# Patient Record
Sex: FEMALE | Race: OTHER | Employment: OTHER | URBAN - METROPOLITAN AREA
[De-identification: names, ages, dates, MRNs, and addresses within clinical notes are randomized per-mention and may not be internally consistent; named-entity substitution may affect disease eponyms.]

---

## 2019-07-02 ENCOUNTER — APPOINTMENT (OUTPATIENT)
Dept: GENERAL RADIOLOGY | Facility: HOSPITAL | Age: 84
End: 2019-07-02
Attending: EMERGENCY MEDICINE

## 2019-07-02 ENCOUNTER — HOSPITAL ENCOUNTER (EMERGENCY)
Facility: HOSPITAL | Age: 84
Discharge: HOME | End: 2019-07-02
Attending: EMERGENCY MEDICINE

## 2019-07-02 VITALS
DIASTOLIC BLOOD PRESSURE: 77 MMHG | SYSTOLIC BLOOD PRESSURE: 151 MMHG | BODY MASS INDEX: 49.04 KG/M2 | TEMPERATURE: 98 F | HEART RATE: 81 BPM | RESPIRATION RATE: 20 BRPM | OXYGEN SATURATION: 98 % | HEIGHT: 56 IN | WEIGHT: 218 LBS

## 2019-07-02 DIAGNOSIS — R73.9 HYPERGLYCEMIA: Primary | ICD-10-CM

## 2019-07-02 DIAGNOSIS — S20.229A CONTUSION OF BACK, UNSPECIFIED LATERALITY, INITIAL ENCOUNTER: ICD-10-CM

## 2019-07-02 LAB
ALBUMIN SERPL-MCNC: 3.3 G/DL (ref 3.4–5)
ALBUMIN/GLOB SERPL: 0.8 {RATIO} (ref 1–2)
ALP LIVER SERPL-CCNC: 106 U/L (ref 55–142)
ALT SERPL-CCNC: 25 U/L (ref 13–56)
ANION GAP SERPL CALC-SCNC: 6 MMOL/L (ref 0–18)
AST SERPL-CCNC: 24 U/L (ref 15–37)
BASOPHILS # BLD AUTO: 0.02 X10(3) UL (ref 0–0.2)
BASOPHILS NFR BLD AUTO: 0.2 %
BILIRUB SERPL-MCNC: 0.7 MG/DL (ref 0.1–2)
BILIRUB UR QL STRIP.AUTO: NEGATIVE
BUN BLD-MCNC: 19 MG/DL (ref 7–18)
BUN/CREAT SERPL: 20.2 (ref 10–20)
CALCIUM BLD-MCNC: 8.9 MG/DL (ref 8.5–10.1)
CHLORIDE SERPL-SCNC: 104 MMOL/L (ref 98–112)
CLARITY UR REFRACT.AUTO: CLEAR
CO2 SERPL-SCNC: 26 MMOL/L (ref 21–32)
COLOR UR AUTO: YELLOW
CREAT BLD-MCNC: 0.94 MG/DL (ref 0.55–1.02)
DEPRECATED RDW RBC AUTO: 48.6 FL (ref 35.1–46.3)
EOSINOPHIL # BLD AUTO: 0.14 X10(3) UL (ref 0–0.7)
EOSINOPHIL NFR BLD AUTO: 1.3 %
ERYTHROCYTE [DISTWIDTH] IN BLOOD BY AUTOMATED COUNT: 14.6 % (ref 11–15)
GLOBULIN PLAS-MCNC: 4 G/DL (ref 2.8–4.4)
GLUCOSE BLD-MCNC: 199 MG/DL (ref 70–99)
GLUCOSE UR STRIP.AUTO-MCNC: NEGATIVE MG/DL
HCT VFR BLD AUTO: 41.6 % (ref 35–48)
HGB BLD-MCNC: 13.6 G/DL (ref 12–16)
IMM GRANULOCYTES # BLD AUTO: 0.08 X10(3) UL (ref 0–1)
IMM GRANULOCYTES NFR BLD: 0.8 %
INR BLD: 2 (ref 0.9–1.1)
KETONES UR STRIP.AUTO-MCNC: NEGATIVE MG/DL
LYMPHOCYTES # BLD AUTO: 2.36 X10(3) UL (ref 1–4)
LYMPHOCYTES NFR BLD AUTO: 22.5 %
M PROTEIN MFR SERPL ELPH: 7.3 G/DL (ref 6.4–8.2)
MCH RBC QN AUTO: 29.7 PG (ref 26–34)
MCHC RBC AUTO-ENTMCNC: 32.7 G/DL (ref 31–37)
MCV RBC AUTO: 90.8 FL (ref 80–100)
MONOCYTES # BLD AUTO: 0.57 X10(3) UL (ref 0.1–1)
MONOCYTES NFR BLD AUTO: 5.4 %
NEUTROPHILS # BLD AUTO: 7.34 X10 (3) UL (ref 1.5–7.7)
NEUTROPHILS # BLD AUTO: 7.34 X10(3) UL (ref 1.5–7.7)
NEUTROPHILS NFR BLD AUTO: 69.8 %
NITRITE UR QL STRIP.AUTO: NEGATIVE
OSMOLALITY SERPL CALC.SUM OF ELEC: 290 MOSM/KG (ref 275–295)
PH UR STRIP.AUTO: 5 [PH] (ref 4.5–8)
PLATELET # BLD AUTO: 168 10(3)UL (ref 150–450)
POTASSIUM SERPL-SCNC: 4.3 MMOL/L (ref 3.5–5.1)
PROT UR STRIP.AUTO-MCNC: NEGATIVE MG/DL
PSA SERPL DL<=0.01 NG/ML-MCNC: 23.9 SECONDS (ref 12.5–14.7)
RBC # BLD AUTO: 4.58 X10(6)UL (ref 3.8–5.3)
RBC UR QL AUTO: NEGATIVE
SODIUM SERPL-SCNC: 136 MMOL/L (ref 136–145)
SP GR UR STRIP.AUTO: 1.01 (ref 1–1.03)
UROBILINOGEN UR STRIP.AUTO-MCNC: <2 MG/DL
WBC # BLD AUTO: 10.5 X10(3) UL (ref 4–11)

## 2019-07-02 PROCEDURE — 85610 PROTHROMBIN TIME: CPT | Performed by: EMERGENCY MEDICINE

## 2019-07-02 PROCEDURE — 80053 COMPREHEN METABOLIC PANEL: CPT | Performed by: EMERGENCY MEDICINE

## 2019-07-02 PROCEDURE — 36415 COLL VENOUS BLD VENIPUNCTURE: CPT

## 2019-07-02 PROCEDURE — 72100 X-RAY EXAM L-S SPINE 2/3 VWS: CPT | Performed by: EMERGENCY MEDICINE

## 2019-07-02 PROCEDURE — 99284 EMERGENCY DEPT VISIT MOD MDM: CPT

## 2019-07-02 PROCEDURE — 72170 X-RAY EXAM OF PELVIS: CPT | Performed by: EMERGENCY MEDICINE

## 2019-07-02 PROCEDURE — 72220 X-RAY EXAM SACRUM TAILBONE: CPT | Performed by: EMERGENCY MEDICINE

## 2019-07-02 PROCEDURE — 81001 URINALYSIS AUTO W/SCOPE: CPT | Performed by: EMERGENCY MEDICINE

## 2019-07-02 PROCEDURE — 85025 COMPLETE CBC W/AUTO DIFF WBC: CPT | Performed by: EMERGENCY MEDICINE

## 2019-07-02 RX ORDER — SIMVASTATIN 10 MG
10 TABLET ORAL NIGHTLY
COMMUNITY

## 2019-07-02 RX ORDER — LOSARTAN POTASSIUM 50 MG/1
TABLET ORAL DAILY
COMMUNITY

## 2019-07-02 RX ORDER — METOPROLOL SUCCINATE 50 MG/1
50 TABLET, EXTENDED RELEASE ORAL DAILY
COMMUNITY

## 2019-07-02 RX ORDER — ACETAMINOPHEN 500 MG
TABLET ORAL
Status: DISCONTINUED
Start: 2019-07-02 | End: 2019-07-02

## 2019-07-02 RX ORDER — SPIRONOLACTONE 25 MG/1
25 TABLET ORAL DAILY
COMMUNITY

## 2019-07-02 RX ORDER — ACETAMINOPHEN 500 MG
1000 TABLET ORAL ONCE
Status: COMPLETED | OUTPATIENT
Start: 2019-07-02 | End: 2019-07-02

## 2019-07-02 RX ORDER — MELOXICAM 7.5 MG/1
7.5 TABLET ORAL DAILY
COMMUNITY

## 2019-07-02 RX ORDER — MORPHINE SULFATE 4 MG/ML
4 INJECTION, SOLUTION INTRAMUSCULAR; INTRAVENOUS EVERY 30 MIN PRN
Status: DISCONTINUED | OUTPATIENT
Start: 2019-07-02 | End: 2019-07-02

## 2019-07-02 RX ORDER — RABEPRAZOLE SODIUM 20 MG/1
20 TABLET, DELAYED RELEASE ORAL DAILY
COMMUNITY

## 2019-07-02 RX ORDER — WARFARIN SODIUM 3 MG/1
3 TABLET ORAL DAILY
Status: ON HOLD | COMMUNITY
End: 2021-12-29

## 2019-07-02 RX ORDER — ONDANSETRON 2 MG/ML
4 INJECTION INTRAMUSCULAR; INTRAVENOUS ONCE
Status: DISCONTINUED | OUTPATIENT
Start: 2019-07-02 | End: 2019-07-02

## 2019-07-02 NOTE — ED INITIAL ASSESSMENT (HPI)
Mid lower back pain x1 day. Fall yesterday. Hit head on walker but no pain with fall. No LOC. Vitals stable.   Hx CHF

## 2019-07-02 NOTE — ED PROVIDER NOTES
Patient Seen in: [facility] Emergency Department    History   Patient presents with:  Back Pain (musculoskeletal)    Stated Complaint: back pain    HPI    Patient presents with back pain. The patient fell and injured her back yesterday.   Family sta Physical Exam    General: Awake and alert, in no acute distress when not moving. HEENT: Normocephalic, atraumatic, pupils equal round and reactive to light, oropharynx clear, uvula midline. Neck: No midline C-spine tenderness.   Cardiovascular: Re Abnormal            Final result                 Please view results for these tests on the individual orders.    RAINBOW DRAW BLUE   RAINBOW DRAW LAVENDER   RAINBOW DRAW LIGHT GREEN   RAINBOW DRAW GOLD          Xr Pelvis (1 View) (cpt=72134)    Result Date lumbar lordosis. No significant spondylolisthesis is present. Vertebral bodies appear maintained in height. Mild intervertebral disc space narrowing is present at L2-3. Likely moderate multilevel facet arthropathy present.       CONCLUSION:  Severe diff

## 2021-06-04 ENCOUNTER — OFFICE VISIT (OUTPATIENT)
Dept: NEUROLOGY | Facility: CLINIC | Age: 86
End: 2021-06-04
Payer: MEDICAID

## 2021-06-04 ENCOUNTER — LAB ENCOUNTER (OUTPATIENT)
Dept: LAB | Age: 86
End: 2021-06-04
Attending: Other
Payer: MEDICAID

## 2021-06-04 VITALS
SYSTOLIC BLOOD PRESSURE: 124 MMHG | RESPIRATION RATE: 16 BRPM | WEIGHT: 218 LBS | BODY MASS INDEX: 49 KG/M2 | DIASTOLIC BLOOD PRESSURE: 72 MMHG | HEART RATE: 70 BPM

## 2021-06-04 DIAGNOSIS — R41.3 MEMORY LOSS: Primary | ICD-10-CM

## 2021-06-04 DIAGNOSIS — R41.3 MEMORY LOSS: ICD-10-CM

## 2021-06-04 DIAGNOSIS — R41.3 IMPAIRMENT OF SHORT-TERM AND LONG-TERM MEMORY: ICD-10-CM

## 2021-06-04 PROCEDURE — 3074F SYST BP LT 130 MM HG: CPT | Performed by: OTHER

## 2021-06-04 PROCEDURE — 3078F DIAST BP <80 MM HG: CPT | Performed by: OTHER

## 2021-06-04 PROCEDURE — 84443 ASSAY THYROID STIM HORMONE: CPT

## 2021-06-04 PROCEDURE — 36415 COLL VENOUS BLD VENIPUNCTURE: CPT

## 2021-06-04 PROCEDURE — 82607 VITAMIN B-12: CPT

## 2021-06-04 PROCEDURE — 80048 BASIC METABOLIC PNL TOTAL CA: CPT

## 2021-06-04 PROCEDURE — 85027 COMPLETE CBC AUTOMATED: CPT

## 2021-06-04 PROCEDURE — 99204 OFFICE O/P NEW MOD 45 MIN: CPT | Performed by: OTHER

## 2021-06-04 RX ORDER — UBIDECARENONE 30 MG
2 CAPSULE ORAL DAILY
COMMUNITY

## 2021-06-04 RX ORDER — DONEPEZIL HYDROCHLORIDE 5 MG/1
5 TABLET, FILM COATED ORAL NIGHTLY
Qty: 30 TABLET | Refills: 2 | Status: SHIPPED | OUTPATIENT
Start: 2021-06-04

## 2021-06-04 NOTE — PROGRESS NOTES
Patient daughter states the patient memory has decreased over the past year. Patient is having difficulty with long and short term memory. Patient states some headaches on and off. Patient states double vision on and off.  Patient states numbness in there h

## 2021-06-04 NOTE — PROGRESS NOTES
HPI:    Patient ID: Ina Montez is a 80year old female. PCP: Dr Haider Monahan    HPI   Patient is an 80-year-old female with history of hypertension, heart disease, osteoarthritis who presented for evaluation of memory loss.  She is self referred and here Negative. Psychiatric/Behavioral: Negative. Negative for behavioral problems, confusion, hallucinations and sleep disturbance. All other systems reviewed and are negative.            Current Outpatient Medications   Medication Sig Dispense Refill   • weakness    Sensory: Sensory examination is normal to light touch symmetrically    Coordination: Finger-to-nose test normal    Gait: deferred, wheel chair bound       TESTS/IMAGING:     none    ASSESSMENT/PLAN:   Memory loss  (primary encounter diagnosis)

## 2021-06-28 ENCOUNTER — HOSPITAL ENCOUNTER (OUTPATIENT)
Dept: MRI IMAGING | Facility: HOSPITAL | Age: 86
Discharge: HOME | End: 2021-06-28
Attending: Other
Payer: MEDICAID

## 2021-06-28 DIAGNOSIS — R41.3 MEMORY LOSS: ICD-10-CM

## 2021-06-28 PROCEDURE — 70551 MRI BRAIN STEM W/O DYE: CPT | Performed by: OTHER

## 2021-07-26 ENCOUNTER — OFFICE VISIT (OUTPATIENT)
Dept: NEUROLOGY | Facility: CLINIC | Age: 86
End: 2021-07-26
Payer: MEDICAID

## 2021-07-26 VITALS
SYSTOLIC BLOOD PRESSURE: 118 MMHG | HEART RATE: 74 BPM | RESPIRATION RATE: 16 BRPM | WEIGHT: 218 LBS | DIASTOLIC BLOOD PRESSURE: 64 MMHG | BODY MASS INDEX: 49 KG/M2

## 2021-07-26 DIAGNOSIS — R41.3 MEMORY LOSS: Primary | ICD-10-CM

## 2021-07-26 PROCEDURE — 99213 OFFICE O/P EST LOW 20 MIN: CPT | Performed by: OTHER

## 2021-07-26 PROCEDURE — 3078F DIAST BP <80 MM HG: CPT | Performed by: OTHER

## 2021-07-26 PROCEDURE — 3074F SYST BP LT 130 MM HG: CPT | Performed by: OTHER

## 2021-07-26 NOTE — PROGRESS NOTES
HPI:    Patient ID: Torri Castillo is a 80year old female. PCP: Dr Dewayne Morales     Patient is an 43-year-old female with history of hypertension, heart disease, osteoarthritis who presented for evaluation of memory loss.   She is self referred and here al Allergic/Immunologic: Negative. Neurological: Positive for weakness. Negative for dizziness, facial asymmetry, speech difficulty and headaches. Hematological: Negative. Psychiatric/Behavioral: Positive for memory loss.  Negative for behavioral pr intact  VIII: Hearing: intact  IX: Palate: intact  XI: Shoulder shrug: intact  XII: Tongue movement: normal    Motor Exam: No tremors.  Strength in the arms normal. Mild bilateral leg weakness    Sensory: Sensory examination is normal to light touch symmetr

## 2021-07-26 NOTE — PATIENT INSTRUCTIONS
1. Oral B12 supplements 250 microgram daily    2. Donepezil at 7 pm    3.  If no improvement, then sleep study

## 2021-09-27 ENCOUNTER — TELEPHONE (OUTPATIENT)
Dept: NEUROLOGY | Facility: CLINIC | Age: 86
End: 2021-09-27

## 2021-09-27 NOTE — TELEPHONE ENCOUNTER
Patient and daughter dropped off [facility] - disability forms. AISHA completed in office. Placed in RN bin for completion.

## 2021-09-30 NOTE — TELEPHONE ENCOUNTER
Called patient's daughter, and informed of below notes from provider    Patient's daughter will call provider for neuropsych testing and will update SANTOS as to decision.

## 2021-09-30 NOTE — TELEPHONE ENCOUNTER
Discussed paperwork with provider and patient will need a neuropsychology appt and testing prior to completing the [place] security paperwork or paperwork can be filled out with notations that testing has not been completed.     Dr Ana White is an Yoruba s

## 2021-10-29 ENCOUNTER — OFFICE VISIT (OUTPATIENT)
Dept: NEUROLOGY | Facility: CLINIC | Age: 86
End: 2021-10-29
Payer: MEDICAID

## 2021-10-29 VITALS
DIASTOLIC BLOOD PRESSURE: 70 MMHG | BODY MASS INDEX: 44.99 KG/M2 | SYSTOLIC BLOOD PRESSURE: 130 MMHG | HEART RATE: 84 BPM | RESPIRATION RATE: 18 BRPM | HEIGHT: 56 IN | WEIGHT: 200 LBS

## 2021-10-29 DIAGNOSIS — R41.3 IMPAIRMENT OF SHORT-TERM AND LONG-TERM MEMORY: ICD-10-CM

## 2021-10-29 DIAGNOSIS — R41.3 MEMORY LOSS: ICD-10-CM

## 2021-10-29 PROCEDURE — 99213 OFFICE O/P EST LOW 20 MIN: CPT | Performed by: OTHER

## 2021-10-29 PROCEDURE — 3078F DIAST BP <80 MM HG: CPT | Performed by: OTHER

## 2021-10-29 PROCEDURE — 3008F BODY MASS INDEX DOCD: CPT | Performed by: OTHER

## 2021-10-29 PROCEDURE — 3075F SYST BP GE 130 - 139MM HG: CPT | Performed by: OTHER

## 2021-10-29 RX ORDER — ERGOCALCIFEROL 1.25 MG/1
50000 CAPSULE ORAL WEEKLY
COMMUNITY
Start: 2021-08-19

## 2021-10-29 NOTE — PROGRESS NOTES
HPI:    Patient ID: Param Kowalski is a 80year old female. PCP: Dr Carleen Montalvo       Patient presented for follow up for possible Dementia. She speaks [place] Tajik and dialect was not available at the  service.    She is here along wit Diabetes Sister    • Diabetes Brother       Social History    Tobacco Use      Smoking status: Never Smoker      Smokeless tobacco: Never Used    Vaping Use      Vaping Use: Never used    Alcohol use: Not Currently    Drug use: Not Currently       Review o well developed, no apparent distress. HEENT: Normocephalic and atraumatic  Cardiovascular: Normal rate, regular rhythm and normal heart sounds. Pulmonary/Chest: Effort normal and breath sounds normal.   Abdominal: Soft.  Bowel sounds are normal.   LE that we have. She indicates understanding      Follow up in about 6 months      John Bonilla MD  [facility]      No orders of the defined types were placed in this encounter.       Meds This Visit:  Requested Prescriptions      No p

## 2021-10-29 NOTE — PROGRESS NOTES
LOV 7/26/21 Memory loss f/u- Patient's memory is the same as LOV. Patient is taking donepezil 5mg nightly.

## 2021-11-01 NOTE — TELEPHONE ENCOUNTER
Late entry: Attempted to call to give contact information for requested neuropsych providers.   Phone rang for several minutes with no answer, will need to call again later

## 2021-11-01 NOTE — TELEPHONE ENCOUNTER
Called patient's daughter and gave the following information again. Dr. Raine Beckett [phone] and Dr. Caitlin Marie [phone]. Per OV notes, [employer] security paperwork to be completed with information that SANTOS has to date.     Completed paperwor

## 2021-11-29 NOTE — TELEPHONE ENCOUNTER
Provider signed paperwork. RN called patient's daughter and informed that paperwork is ready for . Patient's daughter requested the original to be mailed and a copy for .     Original mailed to home address, copy placed at the  fo

## 2021-12-25 ENCOUNTER — APPOINTMENT (OUTPATIENT)
Dept: CT IMAGING | Facility: HOSPITAL | Age: 86
DRG: 871 | End: 2021-12-25
Attending: EMERGENCY MEDICINE
Payer: MEDICAID

## 2021-12-25 ENCOUNTER — APPOINTMENT (OUTPATIENT)
Dept: GENERAL RADIOLOGY | Facility: HOSPITAL | Age: 86
DRG: 871 | End: 2021-12-25
Attending: EMERGENCY MEDICINE
Payer: MEDICAID

## 2021-12-25 ENCOUNTER — HOSPITAL ENCOUNTER (INPATIENT)
Facility: HOSPITAL | Age: 86
LOS: 6 days | Discharge: HOME HEALTH | DRG: 871 | End: 2021-12-31
Attending: EMERGENCY MEDICINE | Admitting: HOSPITALIST
Payer: MEDICAID

## 2021-12-25 DIAGNOSIS — I50.9 ACUTE ON CHRONIC CONGESTIVE HEART FAILURE, UNSPECIFIED HEART FAILURE TYPE (HCC): ICD-10-CM

## 2021-12-25 DIAGNOSIS — J96.01 ACUTE RESPIRATORY FAILURE WITH HYPOXIA (HCC): Primary | ICD-10-CM

## 2021-12-25 DIAGNOSIS — U07.1 COVID-19: ICD-10-CM

## 2021-12-25 DIAGNOSIS — J18.9 COMMUNITY ACQUIRED PNEUMONIA, UNSPECIFIED LATERALITY: ICD-10-CM

## 2021-12-25 DIAGNOSIS — R77.8 ELEVATED TROPONIN: ICD-10-CM

## 2021-12-25 DIAGNOSIS — I48.0 PAROXYSMAL ATRIAL FIBRILLATION (HCC): ICD-10-CM

## 2021-12-25 PROBLEM — R79.89 ELEVATED TROPONIN: Status: ACTIVE | Noted: 2021-12-25

## 2021-12-25 LAB
ALBUMIN SERPL-MCNC: 2.6 G/DL (ref 3.4–5)
ALBUMIN/GLOB SERPL: 0.7 {RATIO} (ref 1–2)
ALP LIVER SERPL-CCNC: 71 U/L
ALT SERPL-CCNC: 26 U/L
ANION GAP SERPL CALC-SCNC: 11 MMOL/L (ref 0–18)
APTT PPP: 25.1 SECONDS (ref 23.3–35.6)
ARTERIAL PATENCY WRIST A: POSITIVE
AST SERPL-CCNC: 41 U/L (ref 15–37)
BASE EXCESS BLDA CALC-SCNC: -3.5 MMOL/L (ref ?–2)
BASOPHILS # BLD: 0 X10(3) UL (ref 0–0.2)
BASOPHILS NFR BLD: 0 %
BILIRUB SERPL-MCNC: 1.2 MG/DL (ref 0.1–2)
BILIRUB UR QL STRIP.AUTO: NEGATIVE
BODY TEMPERATURE: 98.6 F
BUN BLD-MCNC: 34 MG/DL (ref 7–18)
CALCIUM BLD-MCNC: 8.2 MG/DL (ref 8.5–10.1)
CHLORIDE SERPL-SCNC: 94 MMOL/L (ref 98–112)
CK SERPL-CCNC: 119 U/L
CO2 SERPL-SCNC: 21 MMOL/L (ref 21–32)
COHGB MFR BLD: 0.9 % SAT (ref 0–3)
COLOR UR AUTO: YELLOW
CREAT BLD-MCNC: 0.99 MG/DL
EOSINOPHIL # BLD: 0 X10(3) UL (ref 0–0.7)
EOSINOPHIL NFR BLD: 0 %
ERYTHROCYTE [DISTWIDTH] IN BLOOD BY AUTOMATED COUNT: 15.1 %
EST. AVERAGE GLUCOSE BLD GHB EST-MCNC: 192 MG/DL (ref 68–126)
EXPIRATORY PRESSURE: 6 CM H2O
FIO2: 75 %
GLOBULIN PLAS-MCNC: 4 G/DL (ref 2.8–4.4)
GLUCOSE BLD-MCNC: 249 MG/DL (ref 70–99)
GLUCOSE BLD-MCNC: 263 MG/DL (ref 70–99)
GLUCOSE BLD-MCNC: 312 MG/DL (ref 70–99)
GLUCOSE UR STRIP.AUTO-MCNC: NEGATIVE MG/DL
HBA1C MFR BLD: 8.3 % (ref ?–5.7)
HCO3 BLDA-SCNC: 19.9 MEQ/L (ref 22–26)
HCT VFR BLD AUTO: 47.9 %
HGB BLD-MCNC: 16 G/DL
HGB BLD-MCNC: 16.3 G/DL
HYALINE CASTS #/AREA URNS AUTO: PRESENT /LPF
IL6 SERPL-MCNC: 78.8 PG/ML (ref ?–4.4)
INR BLD: 1.72 (ref 0.8–1.2)
INSP PRESSURE: 14 CM H2O
LACTATE BLDA-SCNC: 2.8 MMOL/L (ref 0.5–2)
LACTATE SERPL-SCNC: 1.5 MMOL/L (ref 0.4–2)
LACTATE SERPL-SCNC: 2.1 MMOL/L (ref 0.4–2)
LACTATE SERPL-SCNC: 2.8 MMOL/L (ref 0.4–2)
LEUKOCYTE ESTERASE UR QL STRIP.AUTO: NEGATIVE
LYMPHOCYTES NFR BLD: 14 %
LYMPHOCYTES NFR BLD: 3.56 X10(3) UL (ref 1–4)
MCH RBC QN AUTO: 29.1 PG (ref 26–34)
MCHC RBC AUTO-ENTMCNC: 34 G/DL (ref 31–37)
MCV RBC AUTO: 85.4 FL
METHGB MFR BLD: 0.6 % SAT (ref 0.4–1.5)
MONOCYTES # BLD: 0.76 X10(3) UL (ref 0.1–1)
MONOCYTES NFR BLD: 3 %
MORPHOLOGY: NORMAL
NEUTROPHILS # BLD AUTO: 16.77 X10 (3) UL (ref 1.5–7.7)
NEUTROPHILS NFR BLD: 81 %
NEUTS BAND NFR BLD: 2 %
NEUTS HYPERSEG # BLD: 21.08 X10(3) UL (ref 1.5–7.7)
NITRITE UR QL STRIP.AUTO: NEGATIVE
NT-PROBNP SERPL-MCNC: ABNORMAL PG/ML (ref ?–450)
OSMOLALITY SERPL CALC.SUM OF ELEC: 279 MOSM/KG (ref 275–295)
P/F RATIO: 121.9 MMHG
PCO2 BLDA: 32 MM HG (ref 35–45)
PH BLDA: 7.41 [PH] (ref 7.35–7.45)
PH UR STRIP.AUTO: 5 [PH] (ref 5–8)
PLATELET # BLD AUTO: 210 10(3)UL (ref 150–450)
PLATELET MORPHOLOGY: NORMAL
PO2 BLDA: 91 MM HG (ref 80–105)
POTASSIUM BLD-SCNC: 4.2 MMOL/L (ref 3.6–5.1)
POTASSIUM SERPL-SCNC: 4.6 MMOL/L (ref 3.5–5.1)
PROCALCITONIN SERPL-MCNC: 0.11 NG/ML (ref ?–0.16)
PROT SERPL-MCNC: 6.6 G/DL (ref 6.4–8.2)
PROT UR STRIP.AUTO-MCNC: >=500 MG/DL
PROTHROMBIN TIME: 20.2 SECONDS (ref 11.6–14.8)
RBC # BLD AUTO: 5.61 X10(6)UL
RBC UR QL AUTO: NEGATIVE
SAO2 % BLDA FROM PO2: 97 % (ref 92–100)
SAO2 % BLDA: 96 % (ref 92–100)
SARS-COV-2 RNA RESP QL NAA+PROBE: DETECTED
SODIUM BLD-SCNC: 126 MMOL/L (ref 136–144)
SODIUM SERPL-SCNC: 126 MMOL/L (ref 136–145)
SP GR UR STRIP.AUTO: 1.02 (ref 1–1.03)
TOTAL CELLS COUNTED BLD: 100
TROPONIN I HIGH SENSITIVITY: 224 NG/L
TROPONIN I HIGH SENSITIVITY: 317 NG/L
TROPONIN I HIGH SENSITIVITY: 622 NG/L
UROBILINOGEN UR STRIP.AUTO-MCNC: <2 MG/DL
WBC # BLD AUTO: 25.4 X10(3) UL (ref 4–11)

## 2021-12-25 PROCEDURE — 5A09357 ASSISTANCE WITH RESPIRATORY VENTILATION, LESS THAN 24 CONSECUTIVE HOURS, CONTINUOUS POSITIVE AIRWAY PRESSURE: ICD-10-PCS | Performed by: HOSPITALIST

## 2021-12-25 PROCEDURE — 74177 CT ABD & PELVIS W/CONTRAST: CPT | Performed by: EMERGENCY MEDICINE

## 2021-12-25 PROCEDURE — 71045 X-RAY EXAM CHEST 1 VIEW: CPT | Performed by: EMERGENCY MEDICINE

## 2021-12-25 PROCEDURE — 71275 CT ANGIOGRAPHY CHEST: CPT | Performed by: EMERGENCY MEDICINE

## 2021-12-25 PROCEDURE — 3E0333Z INTRODUCTION OF ANTI-INFLAMMATORY INTO PERIPHERAL VEIN, PERCUTANEOUS APPROACH: ICD-10-PCS | Performed by: HOSPITALIST

## 2021-12-25 PROCEDURE — 70450 CT HEAD/BRAIN W/O DYE: CPT | Performed by: EMERGENCY MEDICINE

## 2021-12-25 PROCEDURE — 99223 1ST HOSP IP/OBS HIGH 75: CPT | Performed by: HOSPITALIST

## 2021-12-25 PROCEDURE — XW033E5 INTRODUCTION OF REMDESIVIR ANTI-INFECTIVE INTO PERIPHERAL VEIN, PERCUTANEOUS APPROACH, NEW TECHNOLOGY GROUP 5: ICD-10-PCS | Performed by: HOSPITALIST

## 2021-12-25 RX ORDER — SODIUM CHLORIDE 9 MG/ML
1000 INJECTION, SOLUTION INTRAVENOUS ONCE
Status: COMPLETED | OUTPATIENT
Start: 2021-12-25 | End: 2021-12-25

## 2021-12-25 RX ORDER — ACETAMINOPHEN 650 MG/1
650 SUPPOSITORY RECTAL ONCE
Status: COMPLETED | OUTPATIENT
Start: 2021-12-25 | End: 2021-12-25

## 2021-12-25 RX ORDER — PANTOPRAZOLE SODIUM 40 MG/1
40 TABLET, DELAYED RELEASE ORAL
Status: DISCONTINUED | OUTPATIENT
Start: 2021-12-26 | End: 2021-12-31

## 2021-12-25 RX ORDER — ALBUTEROL SULFATE 90 UG/1
4 AEROSOL, METERED RESPIRATORY (INHALATION) EVERY 4 HOURS PRN
Status: DISCONTINUED | OUTPATIENT
Start: 2021-12-25 | End: 2021-12-31

## 2021-12-25 RX ORDER — DONEPEZIL HYDROCHLORIDE 5 MG/1
5 TABLET, FILM COATED ORAL NIGHTLY
Status: DISCONTINUED | OUTPATIENT
Start: 2021-12-25 | End: 2021-12-31

## 2021-12-25 RX ORDER — ENOXAPARIN SODIUM 100 MG/ML
0.5 INJECTION SUBCUTANEOUS DAILY
Status: DISCONTINUED | OUTPATIENT
Start: 2021-12-25 | End: 2021-12-26

## 2021-12-25 RX ORDER — FUROSEMIDE 10 MG/ML
40 INJECTION INTRAMUSCULAR; INTRAVENOUS ONCE
Status: COMPLETED | OUTPATIENT
Start: 2021-12-25 | End: 2021-12-25

## 2021-12-25 RX ORDER — GUAIFENESIN 600 MG
600 TABLET, EXTENDED RELEASE 12 HR ORAL 2 TIMES DAILY
Status: DISCONTINUED | OUTPATIENT
Start: 2021-12-25 | End: 2021-12-31

## 2021-12-25 RX ORDER — DEXAMETHASONE SODIUM PHOSPHATE 10 MG/ML
6 INJECTION, SOLUTION INTRAMUSCULAR; INTRAVENOUS ONCE
Status: COMPLETED | OUTPATIENT
Start: 2021-12-25 | End: 2021-12-25

## 2021-12-25 RX ORDER — SODIUM CHLORIDE 9 MG/ML
INJECTION, SOLUTION INTRAVENOUS CONTINUOUS
Status: ACTIVE | OUTPATIENT
Start: 2021-12-25 | End: 2021-12-25

## 2021-12-25 RX ORDER — ONDANSETRON 2 MG/ML
4 INJECTION INTRAMUSCULAR; INTRAVENOUS EVERY 6 HOURS PRN
Status: DISCONTINUED | OUTPATIENT
Start: 2021-12-25 | End: 2021-12-31

## 2021-12-25 RX ORDER — SODIUM CHLORIDE 9 MG/ML
INJECTION, SOLUTION INTRAVENOUS CONTINUOUS
Status: DISCONTINUED | OUTPATIENT
Start: 2021-12-25 | End: 2021-12-26

## 2021-12-25 RX ORDER — PRAVASTATIN SODIUM 20 MG
20 TABLET ORAL NIGHTLY
Status: DISCONTINUED | OUTPATIENT
Start: 2021-12-25 | End: 2021-12-27

## 2021-12-25 RX ORDER — ACETAMINOPHEN 325 MG/1
650 TABLET ORAL EVERY 6 HOURS PRN
Status: DISCONTINUED | OUTPATIENT
Start: 2021-12-25 | End: 2021-12-31

## 2021-12-25 RX ORDER — DEXAMETHASONE SODIUM PHOSPHATE 4 MG/ML
6 VIAL (ML) INJECTION DAILY
Status: DISCONTINUED | OUTPATIENT
Start: 2021-12-26 | End: 2021-12-28

## 2021-12-25 RX ORDER — ACETAMINOPHEN 650 MG/1
SUPPOSITORY RECTAL
Status: COMPLETED
Start: 2021-12-25 | End: 2021-12-25

## 2021-12-25 RX ORDER — DEXTROSE MONOHYDRATE 25 G/50ML
50 INJECTION, SOLUTION INTRAVENOUS
Status: DISCONTINUED | OUTPATIENT
Start: 2021-12-25 | End: 2021-12-31

## 2021-12-25 NOTE — PROGRESS NOTES
[facility] Note:  Anticoagulation Weight Dose Adjustment for enoxaparin    Venkat Yanez is a 80year old patient who has been prescribed enoxaparin 40 mg every 24 hours. Estimated Creatinine Clearance: 56.4 mL/min (based on SCr of 0.99 mg/dL).  Body

## 2021-12-25 NOTE — ED QUICK NOTES
Orders for admission, patient is aware of plan and ready to go upstairs. Any questions, please call ED RN Magy Mcallister at extension [phone].      Patient Covid vaccination status: Unvaccinated     COVID Test Ordered in ED: Rapid SARS-CoV-2 by PCR    COVID Suspicion

## 2021-12-25 NOTE — ED QUICK NOTES
While in CT patient IV in left AC infiltrated with IV contrast being injected. Aprox. 100cc infiltrated.  IV removed and warm pack placed on IV

## 2021-12-25 NOTE — ED PROVIDER NOTES
Patient Seen in: [facility] Emergency Department      History   Patient presents with:  Difficulty Breathing    Stated Complaint: SOB    Subjective:   HPI    Patient is an 70-year-old female presents emergency room with a history of multiple complai History    Tobacco Use      Smoking status: Never Smoker      Smokeless tobacco: Never Used    Vaping Use      Vaping Use: Never used    Alcohol use: Not Currently    Drug use: Not Currently             Review of Systems    Positive for stated complaint: S but improved with BiPAP and answering questions appropriately as per patient's daughter at the bedside. Motor strength is 5 over 5 in all 4 extremities. There are no gross motor or sensory deficits appreciated.   Further neurologic assessment cannot be accu Absolute Manual 21.08 (*)     All other components within normal limits   LACTIC ACID 3 HR POST POSITIVE - Abnormal; Notable for the following components:    Lactic Acid 2.1 (*)     All other components within normal limits   TROPONIN I HIGH SENSITIVITY - (CST):  Rosanna Akers MD on 12/25/2021 at 1:14 PM     Finalized by (CST): Rosanna Akers MD on 12/25/2021 at 1:17 PM       XR CHEST AP PORTABLE  (CPT=71045)    Result Date: 12/25/2021  CONCLUSION:  There is consolidation throughout the mid and lowe disease as well as [facility] pulmonary medicine. Patient is awaiting admission to the ICU at this time. The patient had several IV lines established blood work drawn including CBC, chemistries, BUN/creatinine, and blood sugar.   There is evidence of decision making, and/or extensive discussions with the patient, family, and clinical staff.                        Disposition and Plan     Clinical Impression:  Acute respiratory failure with hypoxia (HCC)  (primary encounter diagnosis)  Community acquired

## 2021-12-25 NOTE — ED INITIAL ASSESSMENT (HPI)
Pt. To ER via EMS for SOB/ Difficulty breathing. Per EMS patient was unresponsive on arrival and then responsive but 80% on room air. Patient is 3 weeks post covid. Pt place pt on bipap and saturation increased to 92%.     Pt presents to the ED responsive t

## 2021-12-25 NOTE — H&P
JACOBO HOSPITALIST  History and Physical     [facility] Patient Status:  Emergency    3/15/1932 MRN [id number]   Location [address] [facility] Attending Lina Terrell MD   Hosp Day # 0 PCP Lynnette Medrano spironolactone 25 MG Oral Tab, Take 25 mg by mouth daily. , Disp: , Rfl:   RABEprazole Sodium 20 MG Oral Tab EC, Take 20 mg by mouth daily. , Disp: , Rfl:   losartan Potassium 50 MG Oral Tab, Take by mouth daily. , Disp: , Rfl:   Meloxicam 7.5 MG Oral Tab, SCr of 0.99 mg/dL).     Recent Labs   Lab 12/25/21  0942   PTP 20.2*   INR 1.72*       COVID-19 Lab Results    COVID-19  Lab Results   Component Value Date    COVID19 Detected (A) 12/25/2021       Pro-Calcitonin  Recent Labs   Lab 12/25/21  0942   PCT 0.11

## 2021-12-25 NOTE — RESPIRATORY THERAPY NOTE
Pt received on BIPAP 12/6 40% RR 16. Pt seems comfortable on BIPAP and is achieving volumes of 550-650. Will continue to monitor at this time.

## 2021-12-26 ENCOUNTER — APPOINTMENT (OUTPATIENT)
Dept: CV DIAGNOSTICS | Facility: HOSPITAL | Age: 86
DRG: 871 | End: 2021-12-26
Attending: HOSPITALIST
Payer: MEDICAID

## 2021-12-26 LAB
ALBUMIN SERPL-MCNC: 2.2 G/DL (ref 3.4–5)
ALBUMIN/GLOB SERPL: 0.8 {RATIO} (ref 1–2)
ALP LIVER SERPL-CCNC: 57 U/L
ALT SERPL-CCNC: 18 U/L
ANION GAP SERPL CALC-SCNC: 10 MMOL/L (ref 0–18)
AST SERPL-CCNC: 23 U/L (ref 15–37)
ATRIAL RATE: 129 BPM
BASOPHILS # BLD AUTO: 0.01 X10(3) UL (ref 0–0.2)
BASOPHILS NFR BLD AUTO: 0.1 %
BILIRUB SERPL-MCNC: 0.6 MG/DL (ref 0.1–2)
BUN BLD-MCNC: 36 MG/DL (ref 7–18)
CALCIUM BLD-MCNC: 7.6 MG/DL (ref 8.5–10.1)
CHLORIDE SERPL-SCNC: 102 MMOL/L (ref 98–112)
CO2 SERPL-SCNC: 25 MMOL/L (ref 21–32)
CREAT BLD-MCNC: 1 MG/DL
CRP SERPL-MCNC: 15.9 MG/DL (ref ?–0.3)
D DIMER PPP FEU-MCNC: 1.18 UG/ML FEU (ref ?–0.89)
DEPRECATED HBV CORE AB SER IA-ACNC: 964.1 NG/ML
EOSINOPHIL # BLD AUTO: 0 X10(3) UL (ref 0–0.7)
EOSINOPHIL NFR BLD AUTO: 0 %
ERYTHROCYTE [DISTWIDTH] IN BLOOD BY AUTOMATED COUNT: 15.2 %
GLOBULIN PLAS-MCNC: 2.9 G/DL (ref 2.8–4.4)
GLUCOSE BLD-MCNC: 193 MG/DL (ref 70–99)
GLUCOSE BLD-MCNC: 208 MG/DL (ref 70–99)
GLUCOSE BLD-MCNC: 217 MG/DL (ref 70–99)
GLUCOSE BLD-MCNC: 226 MG/DL (ref 70–99)
GLUCOSE BLD-MCNC: 235 MG/DL (ref 70–99)
GLUCOSE BLD-MCNC: 241 MG/DL (ref 70–99)
HCT VFR BLD AUTO: 43.2 %
HGB BLD-MCNC: 13.8 G/DL
IMM GRANULOCYTES # BLD AUTO: 0.16 X10(3) UL (ref 0–1)
IMM GRANULOCYTES NFR BLD: 1.2 %
LDH SERPL L TO P-CCNC: 316 U/L
LYMPHOCYTES # BLD AUTO: 2.35 X10(3) UL (ref 1–4)
LYMPHOCYTES NFR BLD AUTO: 17.4 %
MCH RBC QN AUTO: 28 PG (ref 26–34)
MCHC RBC AUTO-ENTMCNC: 31.9 G/DL (ref 31–37)
MCV RBC AUTO: 87.8 FL
MONOCYTES # BLD AUTO: 0.43 X10(3) UL (ref 0.1–1)
MONOCYTES NFR BLD AUTO: 3.2 %
NEUTROPHILS # BLD AUTO: 10.52 X10 (3) UL (ref 1.5–7.7)
NEUTROPHILS # BLD AUTO: 10.52 X10(3) UL (ref 1.5–7.7)
NEUTROPHILS NFR BLD AUTO: 78.1 %
OSMOLALITY SERPL CALC.SUM OF ELEC: 299 MOSM/KG (ref 275–295)
P-R INTERVAL: 160 MS
PLATELET # BLD AUTO: 153 10(3)UL (ref 150–450)
POTASSIUM SERPL-SCNC: 3.6 MMOL/L (ref 3.5–5.1)
PROT SERPL-MCNC: 5.1 G/DL (ref 6.4–8.2)
Q-T INTERVAL: 338 MS
QRS DURATION: 132 MS
QTC CALCULATION (BEZET): 495 MS
R AXIS: -33 DEGREES
RBC # BLD AUTO: 4.92 X10(6)UL
SODIUM SERPL-SCNC: 137 MMOL/L (ref 136–145)
T AXIS: 60 DEGREES
TROPONIN I HIGH SENSITIVITY: 140 NG/L
VENTRICULAR RATE: 129 BPM
WBC # BLD AUTO: 13.5 X10(3) UL (ref 4–11)

## 2021-12-26 PROCEDURE — 93306 TTE W/DOPPLER COMPLETE: CPT | Performed by: HOSPITALIST

## 2021-12-26 PROCEDURE — 99232 SBSQ HOSP IP/OBS MODERATE 35: CPT | Performed by: HOSPITALIST

## 2021-12-26 RX ORDER — ENOXAPARIN SODIUM 100 MG/ML
1 INJECTION SUBCUTANEOUS EVERY 12 HOURS SCHEDULED
Status: DISCONTINUED | OUTPATIENT
Start: 2021-12-26 | End: 2021-12-31

## 2021-12-26 NOTE — PROGRESS NOTES
[facility]                INFECTIOUS DISEASE PROGRESS NOTE    [facility] Patient Status:  Inpatient    3/15/1932 MRN [id number]   [facility] 4SW-A Attending Timothy Calderon DO   Hosp Day # 1 PCP [facility]     Antib (Preliminary result)    Collection Time: 12/25/21 10:14 AM    Specimen: Blood,peripheral   Result Value Ref Range    Blood Culture Result No Growth 1 Day N/A         Radiology      CTA 12/25    Problem list reviewed:  Patient Active Problem List:     Acute

## 2021-12-26 NOTE — PROGRESS NOTES
12/26/21 0507   BiPAP   $ RT Standby Charge (per 15 min) 1   Device V60   BiPAP / CPAP CE# 6093   Mode Spontaneous/Timed   Interface Full face mask   Mask Size Medium   Control Settings   Set Rate 16 breaths/min   Set IPAP 12   Set EPAP 5   Oxygen Perce

## 2021-12-26 NOTE — CONSULTS
Dago Reynoso [address] [facility] [address] Note [facility]  Report of Consultation    Tatyana Jose Alfredo Patient Status:  Inpatient    3/15/1932 MRN [id number]   [facility] 4SW-A Attending Don Lund She reports previous drug use.     Allergies:  No Known Allergies    Medications:  • donepezil  5 mg Oral Nightly   • [START ON 12/26/2021] pantoprazole  40 mg Oral QAM AC   • pravastatin  20 mg Oral Nightly   • piperacillin-tazobactam  3.375 g Intravenous Intake/Output:  [unfilled]  @IOTHIS SHIFT@    FiO2 (%):  [40 %] 40 %    PHYSICAL EXAM  GEN: Appears comfortable. No acute distress. BiPAP mask in place.   PSYCH: Normal mood and affect, AAOX3  NEURO: grossly non-focal exam  HEENT: Atraumatic, norm 1-5   RBCUR 0-2   BACUR None Seen   EPIUR Many*       Radiology:     CT BRAIN OR HEAD (70551)    Result Date: 12/25/2021  CONCLUSION:  1. Mild to moderate chronic deep white matter ischemic changes. No acute disease.  2. Sinusitis, largest air-fluid level failure.  Will check echocardiogram.  · Low threshold to start vasopressors to maintain MAP > 65 mmHg  · Titrate supplemental O2 to maintain SpO2 > 92%  · On zosyn and azithromycin for empiric coverage against bacterial pneumonia  · On decadron 6 mg daily a

## 2021-12-26 NOTE — CONSULTS
[facility] ID CONSULT NOTE    [facility] Patient Status:  Inpatient    3/15/1932 MRN [id number]   [facility] 4SW-A Attending Brandon Whitney MD   Hosp Day # 0 PCP [facility]       Reason for Consultation:  CO this evening.     History:  Past Medical History:   Diagnosis Date   • Aortic valve sclerosis    • Essential hypertension    • Gastric ulcer    • Hyperlipidemia    • Hypertensive heart disease    • Osteoarthritis    • Type 1 diabetes mellitus ([facility] [address]      Pas glucose (DEX4) oral liquid 15 g, 15 g, Oral, Q15 Min PRN **OR** glucose-vitamin C (DEX-4) chewable tab 4 tablet, 4 tablet, Oral, Q15 Min PRN **OR** dextrose 50 % injection 50 mL, 50 mL, Intravenous, Q15 Min PRN **OR** glucose (DEX4) oral liquid 30 g, 30 g, pneumonia   - sx onset unclear potentially several weeks ago status post course of Decadron 10 days reportedly prior to admission   - dx 1225 rapid test positive   - decadron 12/25–   - RDV 12/25–start; if history is correct late in course but if she recei

## 2021-12-26 NOTE — PLAN OF CARE
Assumed care of patient at shift change. Patient drowsy, follows commands. Pupils equal and reactive. Unable to perform full neuro exam due to language barrier. Attempted to use  without success. Pt more alert this AM. Bi-PAP 40%.  NSR/ HR sometim

## 2021-12-26 NOTE — PROGRESS NOTES
[facility] Pulmonary/Critical Care Progress Note     SUBJECTIVE/Interval history: All events, procedures, notes reviewed. Weaned off BiPAP this morning, on 6 L nasal canula. Appears comfortable. No acute complaints. GFRNAA 51* 50*   CA 8.2* 7.6*   * 137   K 4.6 3.6   CL 94* 102   CO2 21.0 25.0     Recent Labs   Lab 12/25/21  0942 12/26/21  0550   RBC 5.61* 4.92   HGB 16.3* 13.8   HCT 47.9 43.2   MCV 85.4 87.8   MCH 29.1 28.0   MCHC 34.0 31.9   RDW 15.1 15.2 Finalized by (CST): Radha Cruz MD on 12/25/2021 at 11:25 AM       CTA CHEST + CT ABD (W) + CT PEL (W) SH(CPT=71275/73105)    Result Date: 12/25/2021  CONCLUSION:   1.  There is moderate burden of patchy ground-glass opacity and airspace consolidation fluids. Echocardiogram pending.   · On low dose levophed overnight, which has since been weaned off  · On zosyn and azithromycin for empiric coverage against bacterial pneumonia, ID following  · On decadron 6 mg daily and Remdesivir  · Sliding scale insulin

## 2021-12-26 NOTE — PROGRESS NOTES
[facility]     Hospitalist Progress Note     [address] [facility] Patient Status:  Inpatient    3/15/1932 MRN [id number]   [facility] 4SW-A Attending Alexis Pennington, [address] Day # 1 PCP [facility]     Chief Complaint: SOB    Sub Imaging: Imaging data reviewed in Epic.     Medications:   • enoxaparin  1 mg/kg Subcutaneous 2 times per day   • donepezil  5 mg Oral Nightly   • pantoprazole  40 mg Oral QAM AC   • pravastatin  20 mg Oral Nightly   • piperacillin-tazobactam  3.375 g

## 2021-12-26 NOTE — PLAN OF CARE
Received patient on bipap. Used interpretor line and daughter. Follows all commands. Denies pain. Slightly short of breath. Turning every 2 hours. purewick intact. toleraing diet. Will continue to monitor patient closely.

## 2021-12-26 NOTE — CONSULTS
[facility]  Cardiology Critical Care Note    [address] [facility] Patient Status:  Inpatient    3/15/1932 MRN [id number]   [facility] 4SW-A Attending Andres Bateman, [address] [facility] Day # 1 PCP [facility]     Reason for Con chloride 0.9% 50 mL infusion for septic shock, 0.01-0.03 Units/min, Intravenous, Continuous PRN  •  Piperacillin Sod-Tazobactam So (ZOSYN) 3.375 g in dextrose 5% 100 mL IVPB-MBP, 3.375 g, Intravenous, Q8H  •  sodium chloride 0.9% IV bolus 500 mL, 500 mL, I Readings from Last 3 Encounters:  12/25/21 : 204 lb 9.4 oz (92.8 kg)  10/29/21 : 200 lb (90.7 kg)  07/26/21 : 218 lb (98.9 kg)      Physical Exam:   General: Confused. No apparent distress. No respiratory or constitutional distress.   HEENT: Normocephalic, matter ischemic changes.  No acute disease.    2. Sinusitis, largest air-fluid level within the left frontal sinus and sphenoid sinus.  Details above.     Dictated by (CST): Sunny Moreland MD on 12/25/2021 at 1:14 PM     CT chest/abd/pelvis 12/25/2021: state  -holding spironolactone and will need to monitor clinically, daily weights, renal function and lytes to help determine if any diuretics needed  -hemodynamic stable presently after weaning off IV levophed to maintain SBP greater than 90 mmHg; holding

## 2021-12-27 LAB
ALBUMIN SERPL-MCNC: 2.1 G/DL (ref 3.4–5)
ALBUMIN/GLOB SERPL: 0.7 {RATIO} (ref 1–2)
ALP LIVER SERPL-CCNC: 54 U/L
ALT SERPL-CCNC: 20 U/L
ANION GAP SERPL CALC-SCNC: 7 MMOL/L (ref 0–18)
AST SERPL-CCNC: 25 U/L (ref 15–37)
BASOPHILS # BLD AUTO: 0.02 X10(3) UL (ref 0–0.2)
BASOPHILS NFR BLD AUTO: 0.1 %
BILIRUB SERPL-MCNC: 0.5 MG/DL (ref 0.1–2)
BUN BLD-MCNC: 34 MG/DL (ref 7–18)
CALCIUM BLD-MCNC: 7.8 MG/DL (ref 8.5–10.1)
CHLORIDE SERPL-SCNC: 106 MMOL/L (ref 98–112)
CHOLEST SERPL-MCNC: 111 MG/DL (ref ?–200)
CO2 SERPL-SCNC: 24 MMOL/L (ref 21–32)
CREAT BLD-MCNC: 0.84 MG/DL
CRP SERPL-MCNC: 10 MG/DL (ref ?–0.3)
D DIMER PPP FEU-MCNC: 0.85 UG/ML FEU (ref ?–0.89)
DEPRECATED HBV CORE AB SER IA-ACNC: 738.8 NG/ML
EOSINOPHIL # BLD AUTO: 0 X10(3) UL (ref 0–0.7)
EOSINOPHIL NFR BLD AUTO: 0 %
ERYTHROCYTE [DISTWIDTH] IN BLOOD BY AUTOMATED COUNT: 15.3 %
GLOBULIN PLAS-MCNC: 2.9 G/DL (ref 2.8–4.4)
GLUCOSE BLD-MCNC: 208 MG/DL (ref 70–99)
GLUCOSE BLD-MCNC: 213 MG/DL (ref 70–99)
GLUCOSE BLD-MCNC: 254 MG/DL (ref 70–99)
GLUCOSE BLD-MCNC: 321 MG/DL (ref 70–99)
GLUCOSE BLD-MCNC: 331 MG/DL (ref 70–99)
HCT VFR BLD AUTO: 40.3 %
HDL CHOLESTEROL (ARUP): 30 MG/DL
HDLC SERPL-MCNC: 30 MG/DL
HGB BLD-MCNC: 12.9 G/DL
IMM GRANULOCYTES # BLD AUTO: 0.12 X10(3) UL (ref 0–1)
IMM GRANULOCYTES NFR BLD: 0.9 %
LDH SERPL L TO P-CCNC: 317 U/L
LDLC SERPL CALC-MCNC: 49 MG/DL (ref ?–100)
LYMPHOCYTES # BLD AUTO: 2.92 X10(3) UL (ref 1–4)
LYMPHOCYTES NFR BLD AUTO: 20.8 %
MCH RBC QN AUTO: 28.2 PG (ref 26–34)
MCHC RBC AUTO-ENTMCNC: 32 G/DL (ref 31–37)
MCV RBC AUTO: 88.2 FL
MONOCYTES # BLD AUTO: 0.65 X10(3) UL (ref 0.1–1)
MONOCYTES NFR BLD AUTO: 4.6 %
NEUTROPHILS # BLD AUTO: 10.31 X10 (3) UL (ref 1.5–7.7)
NEUTROPHILS # BLD AUTO: 10.31 X10(3) UL (ref 1.5–7.7)
NEUTROPHILS NFR BLD AUTO: 73.6 %
NONHDLC SERPL-MCNC: 81 MG/DL (ref ?–130)
OSMOLALITY SERPL CALC.SUM OF ELEC: 298 MOSM/KG (ref 275–295)
PLATELET # BLD AUTO: 154 10(3)UL (ref 150–450)
POTASSIUM SERPL-SCNC: 3.9 MMOL/L (ref 3.5–5.1)
PROT SERPL-MCNC: 5 G/DL (ref 6.4–8.2)
RBC # BLD AUTO: 4.57 X10(6)UL
SODIUM SERPL-SCNC: 137 MMOL/L (ref 136–145)
TRIGL SERPL-MCNC: 191 MG/DL (ref 30–149)
VLDLC SERPL CALC-MCNC: 27 MG/DL (ref 0–30)
WBC # BLD AUTO: 14 X10(3) UL (ref 4–11)

## 2021-12-27 PROCEDURE — 99232 SBSQ HOSP IP/OBS MODERATE 35: CPT | Performed by: HOSPITALIST

## 2021-12-27 PROCEDURE — 99253 IP/OBS CNSLTJ NEW/EST LOW 45: CPT | Performed by: INTERNAL MEDICINE

## 2021-12-27 RX ORDER — DILTIAZEM HYDROCHLORIDE 5 MG/ML
10 INJECTION INTRAVENOUS EVERY 2 HOUR PRN
Status: DISCONTINUED | OUTPATIENT
Start: 2021-12-27 | End: 2021-12-31

## 2021-12-27 RX ORDER — TORSEMIDE 5 MG/1
10 TABLET ORAL DAILY
Status: DISCONTINUED | OUTPATIENT
Start: 2021-12-27 | End: 2021-12-31

## 2021-12-27 NOTE — PROGRESS NOTES
[facility]     Hospitalist Progress Note     [address] [facility] Patient Status:  Inpatient    3/15/1932 MRN [id number]   [facility] 4SW-A Attending Jagruti Rousseau, [address] [facility] Day # 2 PCP [facility]     Chief Complaint: SOB    Sub Markers  Recent Labs   Lab 12/26/21  0550 12/27/21  0536   CRP 15.90* 10.00*   .1* 738.8*   * 317*   DDIMER 1.18* 0.85       Imaging: Imaging data reviewed in Epic.     Medications:   • enoxaparin  1 mg/kg Subcutaneous 2 times per day   • insu

## 2021-12-27 NOTE — PROGRESS NOTES
[facility] Pulmonary/Critical Care Progress Note     SUBJECTIVE/Interval history:  No acute events overnight, remains off of BIPAP. Interviewed with  and denies concerns although later c/o dyspnea.  Has n 126* 137 137   K 4.6 3.6 3.9   CL 94* 102 106   CO2 21.0 25.0 24.0     Recent Labs   Lab 12/25/21  0942 12/26/21  0550 12/27/21  0536   RBC 5.61* 4.92 4.57   HGB 16.3* 13.8 12.9   HCT 47.9 43.2 40.3   MCV 85.4 87.8 88.2   MCH 29.1 28.0 28.2   MCHC 34.0 31. Dictated by (CST): Ariadna Crum MD on 12/25/2021 at 11:24 AM     Finalized by (CST): Ariadna Crum MD on 12/25/2021 at 11:25 AM       CTA CHEST + CT ABD (W) + CT PEL (W) SH(CPT=71275/45494)    Result Date: 12/25/2021  CONCLUSION:   1.  There is mod respiratory status, wean o2 for sats >89%; NIPPV PRN  Continue dexamethasone day 3 of 10, remdesivir day 3 of 5  Follow inflammatory markers and d dimer closely  Encouraged to self prone as able, goal 16 hours/day  Follow fluid balance, lytes, urine output

## 2021-12-27 NOTE — CONSULTS
[facility] Patient Status:  Inpatient    3/15/1932 MRN [id number]   [facility] 5NW-A Attending Deloris Gonzalez MD   Hosp Day # 2 PCP [facility]     Date of Consult:  regarding pt's current clinical situation. Pt was transferred out of ICU today and she has been on NC.   --Pt does not have a health care power of  and does not have a living will.    Daughter mentioned that she has never had discussion with pt alona norepinephrine (LEVOPHED) 4 mg/250 ml premix infusion, 0.5-30 mcg/min, Intravenous, Continuous PRN  •  vasopressin (PITRESSIN) 20 Units in sodium chloride 0.9% 50 mL infusion for septic shock, 0.01-0.03 Units/min, Intravenous, Continuous PRN  •  sodium chl 12/27/2021    ALT 20 12/27/2021    DDIMER 0.85 12/27/2021       Imaging:  No results found. Objective:  Vital Signs:  Blood pressure 128/67, pulse 85, temperature 98.4 °F (36.9 °C), temperature source Oral, resp.  rate 24, weight 204 lb 9.4 oz (92.8 kg),

## 2021-12-27 NOTE — PLAN OF CARE
Assumed care of patient at shift change. Alert, oriented, follows commands. Interpretor line and daughter's assistance used for assessments. 4L high flow nasal cannula. Denies pain. Purewick in place. Daughter updated on patient status and plan of care.  Wi

## 2021-12-27 NOTE — PHYSICAL THERAPY NOTE
PHYSICAL THERAPY EVALUATION - INPATIENT     Room Number: 523/523-A  Evaluation Date: 12/27/2021  Type of Evaluation: Initial  Physician Order: PT Eval and Treat    Presenting Problem: COVID PNA  Co-Morbidities : DM HTN COVID  Reason for Therapy: Mobi 3-5x/week  Number of Visits to Meet Established Goals: 5      CURRENT GOALS    Goal #1 Patient is able to demonstrate supine - sit EOB @ level: minimum assistance     Goal #2 Patient is able to demonstrate transfers Sit to/from Stand at assistance level: s ACTIVITY TOLERANCE                         O2 WALK  Oxygen Therapy  SPO2% on Oxygen at Rest: 92  At rest oxygen flow (liters per minute): 2  SPO2% Ambulation on Oxygen: 90  Ambulation oxygen flow (liters per minute): 2    NEUROLOGICAL FINDIN from EOB with min/mod a and sidestepped to [facility] with min/mod a for balance flexed posture. Patietn reports \"too tired\" via daughter to be up in chair. RN aware of current mobility status and to room with patient medication.   Discussed with daughter Juljam Villafuertepe

## 2021-12-27 NOTE — PAYOR COMM NOTE
--------------  ADMISSION REVIEW     Payor: Jesse Trujillo #:  [id number]  Authorization Number: [id number]    Admit date: 12/25/21  Admit time:  5:15 PM       REVIEW DOCUMENTATION:     ED Provider Notes      ED Provi appetite at home as per patient's daughter. Patient is answering questions appropriately in her native language of Slovak as per patient's daughter at the bedside.     Objective:   Past Medical History:   Diagnosis Date   • Aortic valve sclerosis    • Mehran tenderness to palpation appreciated to the mid abdomen only with no other focal tenderness to palpation appreciated. There is no guarding, no rebound, no mass, and no organomegaly appreciated. There is normoactive bowel sounds. There is no hernia.   EXTREMI normal limits   URINALYSIS WITH CULTURE REFLEX - Abnormal; Notable for the following components:    Clarity Urine Cloudy (*)     Ketones Urine Trace (*)     Protein Urine >=500 (*)     Squamous Epi.  Cells Many (*)     Hyaline Casts Present (*)     All othe LACTIC ACID 3 HR POST POSITIVE   BLOOD CULTURE   BLOOD CULTURE     EKG    Rate, intervals and axes as noted on EKG Report.   Rate: 129  Rhythm: Sinus Rhythm  Reading: Evidence of left bundle branch block no old EKGs for comparison with              CT [facility] 12:40 patient returned from CT scan at this time. Patient with improvement at this time. Patient is verbalizing things with her daughter at this time. Patient appears to be tolerating BiPAP better at this time. Continue to observe at this time.   14:1 infectious disease. Dr. Ailyn Olivares was renotified about the elevated repeat troponin and he will follow this. Patient appears clinically improved on multiple repeat exams here in the ER. The patient is awaiting admission to the ICU at this time.   Patient's jayme Weakness/chills/SOB    History of Present Illness: Yoselin Dewitt is a 80year old female with a PMH of DMII and HTN presented to ED due to weakness, chills, and SOB that began earlier today.  Patient on BiPAP and unable to provide history, history obtained Tablet 24 Hr, Take 50 mg by mouth daily. , Disp: , Rfl:   ergocalciferol 1.25 MG (87013 UT) Oral Cap, Take 50,000 Units by mouth once a week.  (Patient not taking: Reported on 12/25/2021), Disp: , Rfl:   Donepezil HCl 5 MG Oral Tab, Take 1 tablet (5 mg total Creatinine Kinase  No results for input(s): CK in the last 168 hours. Inflammatory Markers  No results for input(s): CRP, NIC, LDH, DDIMER in the last 168 hours. Imaging: Imaging data reviewed in Epic.       ASSESSMENT / PLAN:     #Sepsis second (NOVOLOG) 100 UNIT/ML flexpen 1-10 Units     Date Action Dose Route User    12/26/2021 2121 Given 5 Units Subcutaneous (Right Upper Arm) Katya Tamayo RN    12/26/2021 1550 Given 4 Units Subcutaneous (Left Upper Arm) Barthel, Myrna Pears, RN    12/26/2021 13 flow nasal cannula —     12/26/21 2100 — 97 29 133/71 94 % — High flow nasal cannula —     12/26/21 2000 97.9 °F (36.6 °C) 94 28 127/60 85 % — High flow nasal cannula —     12/26/21 1800 — 97 24 114/58 93 % — — —     12/26/21 1700 — 96 29 115/56 94 concern possible superimposed bacterial pneumonia and CHF components               - Abx started in ER for possible bacterial superinfection     SIRS possible sepsis with temperature 104.4 degrees on admission and leukocytosis POA 25k   -LA 2.8 PCT 0.11 ua maintain MAP > 65 mmHg  · Titrate supplemental O2 to maintain SpO2 > 92%  · On zosyn and azithromycin for empiric coverage against bacterial pneumonia  · On decadron 6 mg daily and Remdesivir, ID following  · Sliding scale insulin  · On lovenox for DVT pro currently  -further evaluation and treatment of COVID-19 infection, pneumonia and respiratory failure per primary and pulm/CC service     D/w MICU RN at bedside.     Critical care time of 40 minutes spent this morning     Thank you for allowing me to parti

## 2021-12-27 NOTE — PROGRESS NOTES
[facility]                INFECTIOUS DISEASE PROGRESS NOTE    [facility] Patient Status:  Inpatient    3/15/1932 MRN [id number]   [facility] 4SW-A Attending Geoffrey Quintana, [address] [facility] Day # 2 PCP [facility]     Antib Encounter on 12/25/21   1.  Blood Culture     Status: None (Preliminary result)    Collection Time: 12/25/21 10:14 AM    Specimen: Blood,peripheral   Result Value Ref Range    Blood Culture Result No Growth 2 Days N/A         Radiology      CTA 12/25    Pro

## 2021-12-27 NOTE — PROGRESS NOTES
Reviewed with nursing staff.     Not examined directly given active Covid-19 infection    Tele reviewed and discussed with nursing - sinus at present    From doorway - resting comfortably on oxygen - no acute respiratory distress    Afebrile  131/53  86 reg

## 2021-12-27 NOTE — PLAN OF CARE
Received pt at 0730 on 4L HFNC. Alert and oriented x4. Pt speaks Divehi dialect; collaborated with pt daughter to translate back and forth for assessment and plan of care instructions. Pupils equal and reactive. Lung sounds clear. Afebrile. BP stable.  Irre

## 2021-12-27 NOTE — PROGRESS NOTES
NURSING ADMISSION NOTE      Patient admitted via Cart  Oriented to room. Safety precautions initiated. Bed in low position. Call light in reach. Received pt Aox4. Syriac speaking. Daughter asked to be  when possible.  Spoke with jumana

## 2021-12-28 LAB
ALBUMIN SERPL-MCNC: 2.5 G/DL (ref 3.4–5)
ALBUMIN/GLOB SERPL: 0.8 {RATIO} (ref 1–2)
ALP LIVER SERPL-CCNC: 58 U/L
ALT SERPL-CCNC: 24 U/L
ANION GAP SERPL CALC-SCNC: 8 MMOL/L (ref 0–18)
AST SERPL-CCNC: 25 U/L (ref 15–37)
BASOPHILS # BLD AUTO: 0.05 X10(3) UL (ref 0–0.2)
BASOPHILS NFR BLD AUTO: 0.3 %
BILIRUB SERPL-MCNC: 0.7 MG/DL (ref 0.1–2)
BUN BLD-MCNC: 41 MG/DL (ref 7–18)
CALCIUM BLD-MCNC: 8.4 MG/DL (ref 8.5–10.1)
CHLORIDE SERPL-SCNC: 105 MMOL/L (ref 98–112)
CO2 SERPL-SCNC: 25 MMOL/L (ref 21–32)
CREAT BLD-MCNC: 1.04 MG/DL
CRP SERPL-MCNC: 4.86 MG/DL (ref ?–0.3)
D DIMER PPP FEU-MCNC: 0.53 UG/ML FEU (ref ?–0.89)
DEPRECATED HBV CORE AB SER IA-ACNC: 659.2 NG/ML
EOSINOPHIL # BLD AUTO: 0 X10(3) UL (ref 0–0.7)
EOSINOPHIL NFR BLD AUTO: 0 %
ERYTHROCYTE [DISTWIDTH] IN BLOOD BY AUTOMATED COUNT: 15.5 %
GLOBULIN PLAS-MCNC: 3.2 G/DL (ref 2.8–4.4)
GLUCOSE BLD-MCNC: 235 MG/DL (ref 70–99)
GLUCOSE BLD-MCNC: 253 MG/DL (ref 70–99)
GLUCOSE BLD-MCNC: 302 MG/DL (ref 70–99)
GLUCOSE BLD-MCNC: 308 MG/DL (ref 70–99)
GLUCOSE BLD-MCNC: 392 MG/DL (ref 70–99)
HCT VFR BLD AUTO: 45.3 %
HGB BLD-MCNC: 14.7 G/DL
IMM GRANULOCYTES # BLD AUTO: 0.17 X10(3) UL (ref 0–1)
IMM GRANULOCYTES NFR BLD: 1.1 %
LDH SERPL L TO P-CCNC: 347 U/L
LYMPHOCYTES # BLD AUTO: 2.86 X10(3) UL (ref 1–4)
LYMPHOCYTES NFR BLD AUTO: 17.8 %
MCH RBC QN AUTO: 28.8 PG (ref 26–34)
MCHC RBC AUTO-ENTMCNC: 32.5 G/DL (ref 31–37)
MCV RBC AUTO: 88.6 FL
MONOCYTES # BLD AUTO: 1.7 X10(3) UL (ref 0.1–1)
MONOCYTES NFR BLD AUTO: 10.6 %
NEUTROPHILS # BLD AUTO: 11.29 X10 (3) UL (ref 1.5–7.7)
NEUTROPHILS # BLD AUTO: 11.29 X10(3) UL (ref 1.5–7.7)
NEUTROPHILS NFR BLD AUTO: 70.2 %
OSMOLALITY SERPL CALC.SUM OF ELEC: 305 MOSM/KG (ref 275–295)
PLATELET # BLD AUTO: 166 10(3)UL (ref 150–450)
POTASSIUM SERPL-SCNC: 4.1 MMOL/L (ref 3.5–5.1)
PROT SERPL-MCNC: 5.7 G/DL (ref 6.4–8.2)
RBC # BLD AUTO: 5.11 X10(6)UL
SODIUM SERPL-SCNC: 138 MMOL/L (ref 136–145)
WBC # BLD AUTO: 16.1 X10(3) UL (ref 4–11)

## 2021-12-28 PROCEDURE — 99232 SBSQ HOSP IP/OBS MODERATE 35: CPT | Performed by: HOSPITALIST

## 2021-12-28 RX ORDER — BISACODYL 10 MG
10 SUPPOSITORY, RECTAL RECTAL ONCE
Status: COMPLETED | OUTPATIENT
Start: 2021-12-28 | End: 2021-12-28

## 2021-12-28 RX ORDER — SPIRONOLACTONE 25 MG/1
25 TABLET ORAL DAILY
Status: DISCONTINUED | OUTPATIENT
Start: 2021-12-28 | End: 2021-12-31

## 2021-12-28 RX ORDER — METOPROLOL SUCCINATE 50 MG/1
50 TABLET, EXTENDED RELEASE ORAL
Status: DISCONTINUED | OUTPATIENT
Start: 2021-12-28 | End: 2021-12-31

## 2021-12-28 RX ORDER — LOSARTAN POTASSIUM 25 MG/1
25 TABLET ORAL DAILY
Status: DISCONTINUED | OUTPATIENT
Start: 2021-12-29 | End: 2021-12-30

## 2021-12-28 NOTE — DIETARY NOTE
[address]     Admitting diagnosis:  Elevated troponin [R77.8]  Acute respiratory failure with [facility] ([facility] [J96.01]  Community acquired pneumonia, unspecified laterality [J18.9]  Acute on chronic congestive hear fredo Glez MS, RD, LDN  Clinical Dietitian  Pager #: [phone]

## 2021-12-28 NOTE — PROGRESS NOTES
[facility] Pulmonary Progress Note     SUBJECTIVE/Interval history:  No acute events overnight, remains off of BIPAP  Continues on 1L NC. No new complaints or concerns.     OBJECTIVE:   12/27/21  2116 12/28/21  0025 12/2 25.0 24.0 25.0     Recent Labs   Lab 12/26/21  0550 12/27/21  0536 12/28/21  1016   RBC 4.92 4.57 5.11   HGB 13.8 12.9 14.7   HCT 43.2 40.3 45.3   MCV 87.8 88.2 88.6   MCH 28.0 28.2 28.8   MCHC 31.9 32.0 32.5   RDW 15.2 15.3 15.5   NEPRELIM 10.52* 10.31* 1 bacterial pneumonia within the lower lobes. 2. There is no evidence of pulmonary embolus. 3. 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    4.  Age-indeterminate com caution with IVF in [facility]; lasix prn  Previously on warfarin, remains on therapeutic LMWh, cardiology following  Follow HR closely, remains controlled  PPX: therapeutic LMWH (previously on warfarin), PPI  Dispo:PMU      Cesar Blair DNP, ACNP-BC  Boone Rafiq L

## 2021-12-28 NOTE — PHYSICAL THERAPY NOTE
PHYSICAL THERAPY TREATMENT NOTE - INPATIENT    Room Number: 523/523-A     Session: 1     Number of Visits to Meet Established Goals: 5    Presenting Problem: COVID PNA  Co-Morbidities : DM HTN COVID  ASSESSMENT     Pt continues to present with impaired difficulty does the patient currently have. ..   Patient Difficulty: Turning over in bed (including adjusting bedclothes, sheets and blankets)?: A Lot   Patient Difficulty: Sitting down on and standing up from a chair with arms (e.g., wheelchair, bedside com

## 2021-12-28 NOTE — CDS QUERY
Heart Failure  Itzel Kim  Dear Dr. Mike Martinez,  Clinical information (provided below) includes a diagnosis of Heart Failure.  For accurate ICD-10-CM code assignment,  PLEASE “X” THE DIAGNOSIS THAT APPLIES:    (    ) Acute Systolic

## 2021-12-28 NOTE — PLAN OF CARE
COVID-19 Daily Discharge Readiness-Nursing    O2 Sat at Rest:   93% on 1L  O2 Sat with Exertion: SPO2% Ambulation on Oxygen: 90  % on Ambulation oxygen flow (liters per minute): 2  liters   Temperature max from last 24 hrs: Temp (24hrs), Av °F (36.7 °C

## 2021-12-28 NOTE — PROGRESS NOTES
[facility]                INFECTIOUS DISEASE PROGRESS NOTE    [facility] Patient Status:  Inpatient    3/15/1932 MRN [id number]   [facility] 4SW-A Attending Vinh Plunkett, DO   Hosp Day # 3 PCP [facility]     Antib 5. 0* 5.7*       No results found for: [facility] Encounter on 12/25/21   1.  Blood Culture     Status: None (Preliminary result)    Collection Time: 12/25/21 10:14 AM    Specimen: Blood,peripheral   Result Value Ref Range    Blood Cultu

## 2021-12-28 NOTE — PROGRESS NOTES
.    Progress Note  Caitlyn Moy Patient Status:  Inpatient    3/15/1932 MRN [id number]   [facility] 5NW-A Attending Janette Phipps MD   Hosp Day # 3 PCP [facility]     Subjective:  I did not enter the room in attempt to mini CO2 21.0 25.0 24.0     Recent Labs   Lab 12/26/21  0550 12/27/21  0536 12/28/21  1016   RBC 4.92 4.57 5.11   HGB 13.8 12.9 14.7   HCT 43.2 40.3 45.3   MCV 87.8 88.2 88.6   MCH 28.0 28.2 28.8   MCHC 31.9 32.0 32.5   RDW 15.2 15.3 15.5   NEPRELIM 10.52* 10 blocker, ARB, spironolactone, torsemide)  • Continue therapeutic lovenox dosing, would resume coumadin when ok with other consultants   • Daily weights and I/O    Plan of care discussed with MAJO.     Lexis Reinoso, JORI  12/28/2021  10:58 AM

## 2021-12-28 NOTE — PROGRESS NOTES
[facility]     Hospitalist Progress Note     [address] [facility] Patient Status:  Inpatient    3/15/1932 MRN [id number]   [facility] 4SW-A Attending Roland Mayen, [address] [facility] Day # 3 PCP [facility]     Chief Complaint: SOB    Sub 0.85       Imaging: Imaging data reviewed in Epic.     Medications:   • bisacodyl  10 mg Rectal Once   • metoprolol succinate  50 mg Oral Daily   • [START ON 12/29/2021] losartan  25 mg Oral Daily   • spironolactone  25 mg Oral Daily   • insulin detemir  8

## 2021-12-29 LAB
ALBUMIN SERPL-MCNC: 2.5 G/DL (ref 3.4–5)
ALBUMIN/GLOB SERPL: 0.9 {RATIO} (ref 1–2)
ALP LIVER SERPL-CCNC: 57 U/L
ALT SERPL-CCNC: 30 U/L
ANION GAP SERPL CALC-SCNC: 8 MMOL/L (ref 0–18)
AST SERPL-CCNC: 34 U/L (ref 15–37)
BILIRUB SERPL-MCNC: 0.8 MG/DL (ref 0.1–2)
BUN BLD-MCNC: 40 MG/DL (ref 7–18)
CALCIUM BLD-MCNC: 8.2 MG/DL (ref 8.5–10.1)
CHLORIDE SERPL-SCNC: 103 MMOL/L (ref 98–112)
CO2 SERPL-SCNC: 25 MMOL/L (ref 21–32)
CREAT BLD-MCNC: 0.88 MG/DL
GLOBULIN PLAS-MCNC: 2.9 G/DL (ref 2.8–4.4)
GLUCOSE BLD-MCNC: 129 MG/DL (ref 70–99)
GLUCOSE BLD-MCNC: 174 MG/DL (ref 70–99)
GLUCOSE BLD-MCNC: 200 MG/DL (ref 70–99)
GLUCOSE BLD-MCNC: 257 MG/DL (ref 70–99)
GLUCOSE BLD-MCNC: 427 MG/DL (ref 70–99)
INR BLD: 2.08 (ref 0.8–1.2)
L PNEUMO AG UR QL: NEGATIVE
OSMOLALITY SERPL CALC.SUM OF ELEC: 297 MOSM/KG (ref 275–295)
POTASSIUM SERPL-SCNC: 3.9 MMOL/L (ref 3.5–5.1)
PROT SERPL-MCNC: 5.4 G/DL (ref 6.4–8.2)
PROTHROMBIN TIME: 23.5 SECONDS (ref 11.6–14.8)
SODIUM SERPL-SCNC: 136 MMOL/L (ref 136–145)
STREP PNEUMO ANTIGEN, URINE: NEGATIVE

## 2021-12-29 PROCEDURE — 99232 SBSQ HOSP IP/OBS MODERATE 35: CPT | Performed by: HOSPITALIST

## 2021-12-29 RX ORDER — WARFARIN SODIUM 3 MG/1
3 TABLET ORAL DAILY
Qty: 30 TABLET | Refills: 1 | Status: SHIPPED | COMMUNITY
Start: 2021-12-29 | End: 2021-12-29

## 2021-12-29 RX ORDER — WARFARIN SODIUM 3 MG/1
3 TABLET ORAL AS DIRECTED
Qty: 30 TABLET | Refills: 1 | Status: SHIPPED | OUTPATIENT
Start: 2021-12-29 | End: 2021-12-31

## 2021-12-29 RX ORDER — DEXAMETHASONE SODIUM PHOSPHATE 4 MG/ML
6 VIAL (ML) INJECTION EVERY 24 HOURS
Status: DISCONTINUED | OUTPATIENT
Start: 2021-12-29 | End: 2021-12-31

## 2021-12-29 RX ORDER — WARFARIN SODIUM 5 MG/1
5 TABLET ORAL
Status: DISCONTINUED | OUTPATIENT
Start: 2021-12-29 | End: 2021-12-29

## 2021-12-29 RX ORDER — FUROSEMIDE 10 MG/ML
20 INJECTION INTRAMUSCULAR; INTRAVENOUS DAILY
Status: COMPLETED | OUTPATIENT
Start: 2021-12-29 | End: 2021-12-30

## 2021-12-29 NOTE — PROGRESS NOTES
[facility]     Hospitalist Progress Note     [address] [facility] Patient Status:  Inpatient    3/15/1932 MRN [id number]   [facility] 4SW-A Attending Kelton Hale, [address] [facility] Day # 4 PCP [facility]     Chief Complaint: SOB    Sub 15.90* 10.00* 4.86*   .1* 738.8* 659.2*   * 317* 347*   DDIMER 1.18* 0.85 0.53       Imaging: Imaging data reviewed in Epic.     Medications:   • metoprolol succinate  50 mg Oral Daily Beta Blocker   • losartan  25 mg Oral Daily   • spironolac

## 2021-12-29 NOTE — PLAN OF CARE
COVID-19 Daily Discharge Readiness-Nursing    O2 Sat at Rest:   94% on 1L  O2 Sat with Exertion: SPO2% Ambulation on Oxygen: 91  % on Ambulation oxygen flow (liters per minute): 2  liters   Temperature max from last 24 hrs: Temp (24hrs), Av.7 °F (36.5

## 2021-12-29 NOTE — PROGRESS NOTES
[facility]                INFECTIOUS DISEASE PROGRESS NOTE    [facility] Patient Status:  Inpatient    3/15/1932 MRN [id number]   [facility] 4SW-A Attending Iqra Ghosh, DO   Hosp Day # 4 PCP [facility]     Antib 0.6 0.5 0.7   TP 5.1* 5.0* 5.7*       No results found for: [facility] Encounter on 12/25/21   1.  Blood Culture     Status: None (Preliminary result)    Collection Time: 12/25/21 10:14 AM    Specimen: Blood,peripheral   Result Value Re

## 2021-12-29 NOTE — OCCUPATIONAL THERAPY NOTE
OCCUPATIONAL THERAPY EVALUATION - INPATIENT     Room Number: 523/523-A  Evaluation Date: 12/28/2021  Type of Evaluation: Initial  Presenting Problem: COVID pneumonia    Physician Order: IP Consult to Occupational Therapy  Reason for Therapy: ADL/IADL Dysfu A  Toileting: total assist    Functional Mobility:  Supine to Sit : Moderate assistance  Sit to Stand:  Moderate assistance  Sit to supine: max A  Chair transfer: max A  Toilet/commode transfer: max A  Ambulation NT    Activity tolerance: tolerated x 1min i grooming and UBD and has assist from family for LB dressing and bathing. Patient has a wheeled commode that is used over the toilet but at times is used to wheel her from the bed to the bathroom. SUBJECTIVE   \"I want to go home now. \"    PAIN ASSESSME min A  Patient will transfer to toilet:  with mod A    ADDITIONAL GOALS:  Patient will independently adhere to precautions during self-care and functional mobility tasks. Writer PPE: Surgical mask, goggles, and gloves worn.      Patient/family PPE: Rohit Maria M

## 2021-12-29 NOTE — PROGRESS NOTES
.    Progress Note  Ara Moreno Patient Status:  Inpatient    3/15/1932 MRN [id number]   [facility] 5NW-A Attending Camilo Guillory MD   Hosp Day # 4 PCP [facility]     Subjective:  Pt resting comfortably in bed.  Remains on 1 limits of normal,      estimated to be 32mm Hg.         Labs:  Recent Labs   Lab 12/26/21  0550 12/27/21  0536 12/28/21  1007   * 213* 253*   BUN 36* 34* 41*   CREATSERUM 1.00 0.84 1.04*   GFRAA 58* 71 55*   GFRNAA 50* 62 48*   CA 7.6* 7.8* 8.4*   NA 12/26:  LVEF 40-45%, regional wall motion abnormalities as above  · I/O inaccurate  · Ascending thoracic aorta aneurysm  · CT measures 4.9 cm  · Atrial fibrillation- historically on coumadin and beta blocker  · Currently on lovenox for AC  · Rates currently

## 2021-12-29 NOTE — CM/SW NOTE
12/29/21 1100   Discharge disposition   Expected discharge disposition Home-Health   Post Acute Care Provider Other  ([facility])   Discharge transportation Private car     Accepted by [facility].     / to remain available for any furthe

## 2021-12-29 NOTE — PROGRESS NOTES
12/29/21 1648   Mobility   O2 walk?  Yes   SPO2% on Room Air at Rest 90   SPO2% on Oxygen at Rest 91   At rest oxygen flow (liters per minute) 3   SPO2% Ambulation on Room Air 86   SPO2% Ambulation on Oxygen 91   Ambulation oxygen flow (liters per minute

## 2021-12-29 NOTE — PAYOR COMM NOTE
--------------  CONTINUED STAY REVIEW    Payor: Jesse Trujillo #:  [id number]  Authorization Number: [id number]    Admit date: 12/25/21  Admit time:  5:15 PM    Admitting Physician: Susan Green MD  Attending Physi Kiah Sutton, RN          Vitals (last day)     Date/Time Temp Pulse Resp BP SpO2 Weight O2 Device O2 Flow Rate (L/min) Who    12/29/21 0750 98 °F (36.7 °C) 68 17 178/89 95 % — Nasal cannula 1 L/min AF    12/29/21 0612 — 76 — — 90 % — Nasal cannula 1 L 0.11         Cardiac      Recent Labs   Lab 12/25/21  0942   PBNP 10,541*         Creatinine Kinase      Recent Labs   Lab 12/25/21  1306            Inflammatory Markers        Recent Labs   Lab 12/26/21  0550 12/27/21  0536 12/28/21  1007   CRP 15. 9 specialist                 ASSESSMENT/PLAN:  1.  COVID-19 pneumonia  -unvaccinated  Per history had tested positive a few weeks ago and treated with steroids  Admitted for hypoxia  -improved with dieureses     2. Cardiomyopathy  ECHO LVEF 40-45%        Comp

## 2021-12-29 NOTE — PROGRESS NOTES
[facility] Progress [facility]     SUBJECTIVE/Interval history:  No acute events overnight, remains off of BIPAP. Daughter provided translation via [employer], c/o fatigue. No dyspnea, occasional cough. No pain.  Vandana Dia [address]   CO2 24.0 25.0 25.0     Recent Labs   Lab 12/26/21  0550 12/27/21  0536 12/28/21  1016   RBC 4.92 4.57 5.11   HGB 13.8 12.9 14.7   HCT 43.2 40.3 45.3   MCV 87.8 88.2 88.6   MCH 28.0 28.2 28.8   MCHC 31.9 32.0 32.5   RDW 15.2 15.3 15.5   NEPRELIM 10.52 C    ASSESSMENT  · Acute hypoxemic respiratory failure due to COVID pneumonia and component of CHF/ pulmonary edema  · Covid-19 pneumonia  · Possible superimposed pneumonia although procalcitonin negative  · HFrEF with mild volume overload  · Afib, rate co

## 2021-12-30 LAB
ALBUMIN SERPL-MCNC: 2.6 G/DL (ref 3.4–5)
ALBUMIN/GLOB SERPL: 0.8 {RATIO} (ref 1–2)
ALP LIVER SERPL-CCNC: 59 U/L
ALT SERPL-CCNC: 34 U/L
ANION GAP SERPL CALC-SCNC: 9 MMOL/L (ref 0–18)
AST SERPL-CCNC: 32 U/L (ref 15–37)
BILIRUB SERPL-MCNC: 0.8 MG/DL (ref 0.1–2)
BUN BLD-MCNC: 48 MG/DL (ref 7–18)
CALCIUM BLD-MCNC: 8.1 MG/DL (ref 8.5–10.1)
CHLORIDE SERPL-SCNC: 102 MMOL/L (ref 98–112)
CO2 SERPL-SCNC: 26 MMOL/L (ref 21–32)
CREAT BLD-MCNC: 1 MG/DL
GLOBULIN PLAS-MCNC: 3.1 G/DL (ref 2.8–4.4)
GLUCOSE BLD-MCNC: 176 MG/DL (ref 70–99)
GLUCOSE BLD-MCNC: 192 MG/DL (ref 70–99)
GLUCOSE BLD-MCNC: 279 MG/DL (ref 70–99)
GLUCOSE BLD-MCNC: 281 MG/DL (ref 70–99)
GLUCOSE BLD-MCNC: 305 MG/DL (ref 70–99)
INR BLD: 1.65 (ref 0.8–1.2)
NT-PROBNP SERPL-MCNC: 4229 PG/ML (ref ?–450)
OSMOLALITY SERPL CALC.SUM OF ELEC: 302 MOSM/KG (ref 275–295)
POTASSIUM SERPL-SCNC: 4 MMOL/L (ref 3.5–5.1)
PROT SERPL-MCNC: 5.7 G/DL (ref 6.4–8.2)
PROTHROMBIN TIME: 19.6 SECONDS (ref 11.6–14.8)
SODIUM SERPL-SCNC: 137 MMOL/L (ref 136–145)

## 2021-12-30 PROCEDURE — 99232 SBSQ HOSP IP/OBS MODERATE 35: CPT | Performed by: HOSPITALIST

## 2021-12-30 RX ORDER — LOSARTAN POTASSIUM 50 MG/1
50 TABLET ORAL DAILY
Status: DISCONTINUED | OUTPATIENT
Start: 2021-12-30 | End: 2021-12-31

## 2021-12-30 NOTE — PROGRESS NOTES
[facility]     Hospitalist Progress Note     [address] [facility] Patient Status:  Inpatient    3/15/1932 MRN [id number]   [facility] 5NW-A Attending Esther Lerner MD   Hosp Day # 5 PCP [facility]     Chief Complaint: Raya Feeler Pro-Calcitonin  Recent Labs   Lab 12/25/21  0942   PCT 0.11       Cardiac  Recent Labs   Lab 12/30/21  0745   PBNP 4,229*       Creatinine Kinase  Recent Labs   Lab 12/25/21  1306          Inflammatory Markers  Recent Labs   Lab 12/26/21  0539

## 2021-12-30 NOTE — PROGRESS NOTES
.    Progress Note  Matrha Deras Patient Status:  Inpatient    3/15/1932 MRN [id number]   [facility] 5NW-A Attending Samuel Estrada MD   Hosp Day # 5 PCP [facility]     Subjective:  Pt sitting up in chair eating breakfast. Re Lab 12/26/21  0550 12/27/21  0536 12/28/21  1016   RBC 4.92 4.57 5.11   HGB 13.8 12.9 14.7   HCT 43.2 40.3 45.3   MCV 87.8 88.2 88.6   MCH 28.0 28.2 28.8   MCHC 31.9 32.0 32.5   RDW 15.2 15.3 15.5   NEPRELIM 10.52* 10.31* 11.29*   WBC 13.5* 14.0* 16.1* blocker  · Currently on lovenox for [facility]  · Rates currently well controlled   · HTN-slightly elevated, meds adjusted  · Hyperlipidemia- on statin  · DMII      Plan:  • IV lasix ordered per pulmonology, will hold PO torsemide for now  • Continue with current

## 2021-12-30 NOTE — PLAN OF CARE
Multidisciplinary Discharge Rounds held 12/29/2021. Treatment team members present today include , , Charge Nurse, Nurse, RT, PT and Pharmacy caring for SHAUN Malin.      Other care providers present:    Mobility Goal: up in ch

## 2021-12-30 NOTE — CM/SW NOTE
cortez gilbert [facility] is approved, call to [address] to prepare script.     Sheldon Reyna RN,   [phone]

## 2021-12-30 NOTE — PLAN OF CARE
COVID-19 Daily Discharge Readiness-Nursing    O2 Sat at Rest:  SPO2% on Room Air at Rest: 93  %   O2 Sat with Exertion: SPO2% Ambulation on Oxygen: 92  % on Ambulation oxygen flow (liters per minute): 2  liters   Temperature max from last 24 hrs: Temp (24h with the plan of care.     Problem: Patient/Family Goals  Goal: Patient/Family Long Term Goal  Description: Patient's Long Term Goal: go home    Interventions:  - COVID treatment  - See additional Care Plan goals for specific interventions  Outcome: Progres

## 2021-12-30 NOTE — PROGRESS NOTES
12/30/21 0700   Mobility   SPO2% on Room Air at Rest 93   SPO2% on Oxygen at Rest 94   At rest oxygen flow (liters per minute) 2   SPO2% Ambulation on Room Air 87   SPO2% Ambulation on Oxygen 92   Ambulation oxygen flow (liters per minute) 2

## 2021-12-30 NOTE — PLAN OF CARE
Problem: Patient/Family Goals  Goal: Patient/Family Long Term Goal  Description: Patient's Long Term Goal: go home    Interventions:  - COVID treatment  - See additional Care Plan goals for specific interventions  12/30/2021 0331 by Dylan Mcleod RN

## 2021-12-30 NOTE — PHYSICAL THERAPY NOTE
PHYSICAL THERAPY TREATMENT NOTE - INPATIENT    Room Number: 523/523-A     Session: 2     Number of Visits to Meet Established Goals: 5    Presenting Problem: COVID PNA  Co-Morbidities : DM HTN COVID  ASSESSMENT     Pt able to progress mobility this sess AM-PAC '6-Clicks' INPATIENT SHORT FORM - BASIC MOBILITY  How much difficulty does the patient currently have. ..   Patient Difficulty: Turning over in bed (including adjusting bedclothes, sheets and blankets)?: A Little   Patient Difficulty: Sitting do concerns addressed; Alarm set    Therapist PPE: Mask, gloves gown  and goggles were worn.   Patient/Family PPE: Mask no, pt coughing

## 2021-12-30 NOTE — COVID NURSING ASSESSMENT
COVID-19 Daily Discharge Readiness-Nursing    O2 Sat at Rest:  SPO2% on Room Air at Rest: 90  %   O2 Sat with Exertion: SPO2% Ambulation on Oxygen: 91  % on Ambulation oxygen flow (liters per minute): 3  liters   Temperature max from last 24 hrs: Temp (24h

## 2021-12-30 NOTE — CDS QUERY
Clarification Elevated Troponin  CLINICAL DOCUMENTATION CLARIFICATION FORM  ,  Clinical information (provided below) includes documentation of an elevated troponin. For accurate ICD-10-CM code assignment,  PLEASE CHECK THE DIAGNOSIS THAT APPLIES.

## 2021-12-30 NOTE — OCCUPATIONAL THERAPY NOTE
OCCUPATIONAL THERAPY TREATMENT NOTE - INPATIENT     Room Number: 523/523-A  Session: 1   Number of Visits to Meet Established Goals: 7    History:   Patient is a 80year old female admitted on 12/25/2021 from home for SOB chills weakness and unresponsiveme of walker and CGA. Patient's daughter assisting with interpreting via Face Time. Patient noted to desat to 87% after ~30 seconds in sitting. Patient required 1lo2nc to recover to 90% after 4 min of attempting room air and diaphragmatic breathing.   Fidel meals?: None    AM-PAC Score:  Score: 17  Approx Degree of Impairment: 50.11%  Standardized Score (AM-PAC Scale): 37.26    PLAN  OT Treatment Plan: Balance activities; ADL training;Energy conservation/work simplification techniques; Functional transfer train

## 2021-12-31 VITALS
WEIGHT: 200.31 LBS | HEART RATE: 63 BPM | OXYGEN SATURATION: 94 % | SYSTOLIC BLOOD PRESSURE: 167 MMHG | DIASTOLIC BLOOD PRESSURE: 77 MMHG | TEMPERATURE: 98 F | BODY MASS INDEX: 45 KG/M2 | RESPIRATION RATE: 18 BRPM

## 2021-12-31 LAB
GLUCOSE BLD-MCNC: 131 MG/DL (ref 70–99)
INR BLD: 1.45 (ref 0.8–1.2)
PROTHROMBIN TIME: 17.7 SECONDS (ref 11.6–14.8)

## 2021-12-31 PROCEDURE — 99239 HOSP IP/OBS DSCHRG MGMT >30: CPT | Performed by: HOSPITALIST

## 2021-12-31 RX ORDER — TORSEMIDE 10 MG/1
10 TABLET ORAL DAILY
Qty: 30 TABLET | Refills: 0 | Status: SHIPPED | OUTPATIENT
Start: 2021-12-31 | End: 2022-01-30

## 2021-12-31 NOTE — PAYOR COMM NOTE
--------------  12/31 CONTINUED STAY REVIEW    Payor: Jesse Trujillo #:  [id number]  Authorization Number: [id number]           MEDICATIONS ADMINISTERED IN LAST 1 DAY:  dexamethasone Sodium Phosphate (DECADRON) 4 MG/M 12/31/2021 0830 Given 25 mg Oral Idania Lauren RN          Vitals (last day)     Date/Time Temp Pulse Resp BP SpO2 O2 Device O2 Flow Rate (L/min)    12/31/21 0834 — — — 167/77 — — —    12/31/21 0719 — — — — — — —    12/31/21 0719 98 °F (36.7 °C)

## 2021-12-31 NOTE — PROGRESS NOTES
[facility] Pulmonary Progress Note     SUBJECTIVE/Interval history:  No acute events overnight, remains off of BIPAP. Noted fatigue. No dyspnea. Afebrile.  Weaned to RA    OBJECTIVE:   12/31/21  0022 12/31/21  0459 12/31/ 26.0     Recent Labs   Lab 12/26/21  0550 12/27/21  0536 12/28/21  1016   RBC 4.92 4.57 5.11   HGB 13.8 12.9 14.7   HCT 43.2 40.3 45.3   MCV 87.8 88.2 88.6   MCH 28.0 28.2 28.8   MCHC 31.9 32.0 32.5   RDW 15.2 15.3 15.5   NEPRELIM 10.52* 10.31* 11.29*   WB **OR** glucose-vitamin C    ASSESSMENT  · Acute hypoxemic respiratory failure due to COVID pneumonia and component of CHF/ pulmonary edema  · Covid-19 pneumonia  · Possible superimposed pneumonia although procalcitonin negative  · HFrEF with mild volume ov

## 2021-12-31 NOTE — PLAN OF CARE
COVID-19 Daily Discharge Readiness-Nursing    O2 Sat at Rest:  SPO2% on Room Air at Rest: 92  %   O2 Sat with Exertion: SPO2% Ambulation on Oxygen: 92  % on Ambulation oxygen flow (liters per minute): 2  liters   Temperature max from last 24 hrs: Temp (24h symptoms of hyperglycemia and hypoglycemia  - Administer ordered medications to maintain glucose within target range  - Assess barriers to adequate nutritional intake and initiate nutrition consult as needed  - Instruct patient on self management of diabet

## 2021-12-31 NOTE — PHYSICAL THERAPY NOTE
PHYSICAL THERAPY TREATMENT NOTE - INPATIENT    Room Number: 523/523-A     Session: 3     Number of Visits to Meet Established Goals: 5    Presenting Problem: COVID PNA  Co-Morbidities : DM HTN COVID  ASSESSMENT     Pt continues to present with decreased Poor +  Dynamic Standing: Poor +    ACTIVITY TOLERANCE                         O2 WALK         AM-PAC '6-Clicks' INPATIENT SHORT FORM - BASIC MOBILITY  How much difficulty does the patient currently have. ..   Patient Difficulty: Turning over in bed (includi reach;RN aware of session/findings; All patient questions and concerns addressed; Alarm set    Therapist PPE: Mask, gloves gown  and goggles were worn.   Patient/Family PPE: Mask no, pt coughing

## 2021-12-31 NOTE — PLAN OF CARE
Per chart review, and discussion with RN pt stable from cardiac standpoint. Changing coumadin to eliquis. Diuretics per pulmonary. Follow up with Dr. Flakita Dowd in 3-4 weeks, after isolation completed.

## 2021-12-31 NOTE — PLAN OF CARE
Problem: Patient/Family Goals  Goal: Patient/Family Long Term Goal  Description: Patient's Long Term Goal: go home    Interventions:  - COVID treatment  - See additional Care Plan goals for specific interventions  Outcome: Progressing  Goal: Patient/Fami

## 2021-12-31 NOTE — PROGRESS NOTES
NURSING DISCHARGE NOTE    Discharged Home via Wheelchair. Accompanied by Family member  Belongings GIVEN TO PT.  DISCHARGE INSTRUCTIONS AND [address] TO PT AND DAUGHTER.  INSTRUCTONS DISCUSSED WITH

## 2021-12-31 NOTE — PLAN OF CARE
COVID-19 Daily Discharge Readiness-Nursing    O2 Sat at Rest:  SPO2% on Room Air at Rest: 92  %   O2 Sat with Exertion: SPO2% Ambulation on Oxygen: 92  % on Ambulation oxygen flow (liters per minute): 2  liters   Temperature max from last 24 hrs: Temp (24h oxygenation  Description: INTERVENTIONS:  - Assess for changes in respiratory status  - Assess for changes in mentation and behavior  - Position to facilitate oxygenation and minimize respiratory effort  - Oxygen supplementation based on oxygen saturation

## 2021-12-31 NOTE — PROGRESS NOTES
[facility]                INFECTIOUS DISEASE PROGRESS NOTE    [facility] Patient Status:  Inpatient    3/15/1932 MRN [id number]   [facility] 4SW-A Attending [place] Doctor, [address] [facility] Day # 5 PCP [facility] [place]     Antib TP 5.7* 5.4* 5.7*       No results found for: [facility] Encounter on 12/25/21   1.  Blood Culture     Status: None    Collection Time: 12/25/21 10:14 AM    Specimen: Blood,peripheral   Result Value Ref Range    Blood Culture Result N

## 2021-12-31 NOTE — PROGRESS NOTES
[facility] Pulmonary Progress Note     SUBJECTIVE/Interval history:  Late, entry, patient seen ~ 12pm  No acute events overnight, remains off of BIPAP and O2. Daughter provided translation via Mamina Shkola. No dyspnea.  No pa 8.2* 8.1*    136 137   K 4.1 3.9 4.0    103 102   CO2 25.0 25.0 26.0     Recent Labs   Lab 12/26/21  0550 12/27/21  0536 12/28/21  1016   RBC 4.92 4.57 5.11   HGB 13.8 12.9 14.7   HCT 43.2 40.3 45.3   MCV 87.8 88.2 88.6   MCH 28.0 28.2 28.8   M acetaminophen, ondansetron, glucose **OR** glucose-vitamin C **OR** dextrose **OR** glucose **OR** glucose-vitamin C    ASSESSMENT  · Acute hypoxemic respiratory failure due to COVID pneumonia and component of CHF/ pulmonary edema  · Covid-19 pneumonia  ·

## 2021-12-31 NOTE — CM/SW NOTE
12/31/21 1200   Discharge disposition   Expected discharge disposition Home-Health   Post Acute Care Provider Other  ([facility])   Discharge transportation Private car     Call received from [facility], informed of dc today. Will send avs when available.    Tabitha Bence

## 2022-01-07 NOTE — DISCHARGE SUMMARY
[facility]  DISCHARGE SUMMARY     [address] Patient Status:  Inpatient    3/15/1932 MRN [id number]   [facility] 5NW-A Attending No att. providers found   Hosp Day # 6 PCP [facility]     Date of Admission: 2021 tablet  Refills: 2     torsemide 10 MG Tabs  Commonly known as: DEMADEX      Take 1 tablet (10 mg total) by mouth daily.    Stop taking on: January 30, 2022  Quantity: 30 tablet  Refills: 0        CONTINUE taking these medications      Instructions Prescrip ANDREZ Ibrahim 70  [phone]    In 1 month  obtain a chest xray prior to appointment    Appointments for Next 30 Days 1/7/2022 - 2/6/2022              Date Arrival Time Visit Type Length Department Provider     1/24/2022 11:20 AM  [facility] CHEST PA Specialist ([phone], option 5).                     1/24/2022  1:30 PM  FOLLOW UP VISIT [4661] 20 min Lucy Argueta MD    Patient Instructions:                         Vital signs:       Physical Ex

## 2022-01-24 ENCOUNTER — HOSPITAL ENCOUNTER (OUTPATIENT)
Dept: GENERAL RADIOLOGY | Age: 87
Discharge: HOME | End: 2022-01-24
Attending: INTERNAL MEDICINE
Payer: MEDICAID

## 2022-01-24 DIAGNOSIS — U07.1 COVID-19: ICD-10-CM

## 2022-01-24 PROCEDURE — 71046 X-RAY EXAM CHEST 2 VIEWS: CPT | Performed by: INTERNAL MEDICINE

## 2022-01-28 ENCOUNTER — OFFICE VISIT (OUTPATIENT)
Dept: SURGERY | Facility: CLINIC | Age: 87
End: 2022-01-28
Payer: MEDICAID

## 2022-01-28 VITALS
HEIGHT: 56 IN | TEMPERATURE: 97 F | HEART RATE: 88 BPM | WEIGHT: 200 LBS | SYSTOLIC BLOOD PRESSURE: 111 MMHG | DIASTOLIC BLOOD PRESSURE: 69 MMHG | BODY MASS INDEX: 44.99 KG/M2

## 2022-01-28 DIAGNOSIS — K62.5 RECTAL BLEEDING: ICD-10-CM

## 2022-01-28 DIAGNOSIS — K62.89 RECTAL PAIN: Primary | ICD-10-CM

## 2022-01-28 DIAGNOSIS — K60.2 FISSURE IN ANO: ICD-10-CM

## 2022-01-28 PROCEDURE — 3078F DIAST BP <80 MM HG: CPT | Performed by: SURGERY

## 2022-01-28 PROCEDURE — 3008F BODY MASS INDEX DOCD: CPT | Performed by: SURGERY

## 2022-01-28 PROCEDURE — 3074F SYST BP LT 130 MM HG: CPT | Performed by: SURGERY

## 2022-01-28 PROCEDURE — 99215 OFFICE O/P EST HI 40 MIN: CPT | Performed by: SURGERY

## 2022-01-28 NOTE — H&P
New Patient Visit Note       Active Problems      1. Rectal pain    2. Rectal bleeding    3. 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 c/o rectal pain, burning sensation and bleeding.  Feels constipat Potassium 50 MG Oral Tab, Take by mouth daily. , Disp: , Rfl:   •  Meloxicam 7.5 MG Oral Tab, Take 7.5 mg by mouth daily. , Disp: , Rfl:   •  Metoprolol Succinate ER 50 MG Oral Tablet 24 Hr, Take 50 mg by mouth daily. , Disp: , Rfl:       Review of Systems  T Comments: Redundant perianal skin. Increased sphincter tone. Anoscopy was not performed due to patient discomfort. No evidence of active bleeding. No evidence of external hemorrhoids. No prolapsed internal hemorrhoids noted. No edema noted.  No skin rash o Colonoscopy-patient has never had colonoscopy  Family history colorectal disease-no family history of colorectal carcinoma, ulcerative colitis, Crohn's disease    On examination there is a possible fissure in the anterior midline however examination was li

## 2022-01-31 ENCOUNTER — TELEPHONE (OUTPATIENT)
Dept: SURGERY | Facility: CLINIC | Age: 87
End: 2022-01-31

## 2022-01-31 NOTE — TELEPHONE ENCOUNTER
Pt's daughter states her mom's jaw shakes intermittently. It will do it for a minute or so and then go away. No other symptoms correlated. Pt has COVID in December 2021. Pt scheduled appt for 2/9/22. Informed pt''s daughter provider will most likely want to see pt in person to make recommendations but that we would send her the message and ask.

## 2022-01-31 NOTE — TELEPHONE ENCOUNTER
Pt's daughter concerned as pt's jaw/ mouth shakes at times.  please call to discuss. (pt has appt 2/21)

## 2022-02-09 ENCOUNTER — OFFICE VISIT (OUTPATIENT)
Dept: NEUROLOGY | Facility: CLINIC | Age: 87
End: 2022-02-09
Payer: MEDICAID

## 2022-02-09 VITALS
DIASTOLIC BLOOD PRESSURE: 68 MMHG | RESPIRATION RATE: 18 BRPM | WEIGHT: 200 LBS | BODY MASS INDEX: 45 KG/M2 | SYSTOLIC BLOOD PRESSURE: 122 MMHG | HEART RATE: 94 BPM

## 2022-02-09 DIAGNOSIS — R41.9 NEUROCOGNITIVE DISORDER: Primary | ICD-10-CM

## 2022-02-09 DIAGNOSIS — R25.1 TREMOR OF FACE AND HANDS: ICD-10-CM

## 2022-02-09 DIAGNOSIS — R41.3 MEMORY LOSS: ICD-10-CM

## 2022-02-09 PROBLEM — G30.9 ALZHEIMER'S DISEASE, UNSPECIFIED (HCC): Status: ACTIVE | Noted: 2022-02-09

## 2022-02-09 PROBLEM — F02.80 ALZHEIMER'S DISEASE, UNSPECIFIED (HCC): Status: ACTIVE | Noted: 2022-02-09

## 2022-02-09 PROCEDURE — 99214 OFFICE O/P EST MOD 30 MIN: CPT | Performed by: OTHER

## 2022-02-09 PROCEDURE — 3078F DIAST BP <80 MM HG: CPT | Performed by: OTHER

## 2022-02-09 PROCEDURE — 3074F SYST BP LT 130 MM HG: CPT | Performed by: OTHER

## 2022-02-09 RX ORDER — DONEPEZIL HYDROCHLORIDE 5 MG/1
5 TABLET, FILM COATED ORAL NIGHTLY
Qty: 30 TABLET | Refills: 5 | Status: SHIPPED | OUTPATIENT
Start: 2022-02-09

## 2022-02-09 RX ORDER — TORSEMIDE 10 MG/1
10 TABLET ORAL DAILY
COMMUNITY

## 2022-02-09 RX ORDER — FUROSEMIDE 20 MG/1
20 TABLET ORAL 2 TIMES DAILY
COMMUNITY
End: 2022-02-09

## 2022-02-09 NOTE — PROGRESS NOTES
Patient states decrease in memory after having COVID in 12/2021. Patient states lower jaw trembling.

## 2022-09-14 DIAGNOSIS — R41.9 NEUROCOGNITIVE DISORDER: Primary | ICD-10-CM

## 2022-09-14 RX ORDER — DONEPEZIL HYDROCHLORIDE 5 MG/1
TABLET, FILM COATED ORAL
Qty: 30 TABLET | Refills: 2 | Status: SHIPPED | OUTPATIENT
Start: 2022-09-14

## 2024-06-26 ENCOUNTER — V-VISIT (OUTPATIENT)
Dept: URGENT CARE | Age: 89
End: 2024-06-26

## 2024-06-26 VITALS
RESPIRATION RATE: 16 BRPM | HEART RATE: 77 BPM | SYSTOLIC BLOOD PRESSURE: 112 MMHG | WEIGHT: 203 LBS | DIASTOLIC BLOOD PRESSURE: 60 MMHG | TEMPERATURE: 98 F | OXYGEN SATURATION: 94 % | BODY MASS INDEX: 40.92 KG/M2 | HEIGHT: 59 IN

## 2024-06-26 DIAGNOSIS — L08.9 LOCALIZED INFECTION OF SKIN: Primary | ICD-10-CM

## 2024-06-26 PROBLEM — E11.9 TYPE 2 DIABETES MELLITUS WITHOUT COMPLICATION  (CMD): Status: ACTIVE | Noted: 2021-12-27

## 2024-06-26 PROBLEM — I10 HYPERTENSION: Status: ACTIVE | Noted: 2022-01-27

## 2024-06-26 PROBLEM — I48.0 PAROXYSMAL ATRIAL FIBRILLATION  (CMD): Status: ACTIVE | Noted: 2022-01-27

## 2024-06-26 PROBLEM — E78.2 HYPERLIPIDEMIA, MIXED: Status: ACTIVE | Noted: 2022-01-27

## 2024-06-26 PROBLEM — I50.22 CHRONIC SYSTOLIC HEART FAILURE  (CMD): Status: ACTIVE | Noted: 2022-01-27

## 2024-06-26 PROCEDURE — 99203 OFFICE O/P NEW LOW 30 MIN: CPT | Performed by: NURSE PRACTITIONER

## 2024-06-26 RX ORDER — SIMVASTATIN 10 MG
10 TABLET ORAL AT BEDTIME
COMMUNITY

## 2024-06-26 RX ORDER — DOXYCYCLINE HYCLATE 100 MG
100 TABLET ORAL 2 TIMES DAILY
Qty: 14 TABLET | Refills: 0 | Status: SHIPPED | OUTPATIENT
Start: 2024-06-26 | End: 2024-07-03

## 2024-06-26 RX ORDER — ERGOCALCIFEROL 1.25 MG/1
1.25 CAPSULE ORAL
COMMUNITY
Start: 2024-04-08

## 2024-06-26 RX ORDER — LOSARTAN POTASSIUM 50 MG/1
50 TABLET ORAL DAILY
COMMUNITY

## 2024-06-26 RX ORDER — RABEPRAZOLE SODIUM 20 MG/1
20 TABLET, DELAYED RELEASE ORAL DAILY
COMMUNITY

## 2024-06-26 RX ORDER — APIXABAN 5 MG/1
5 TABLET, FILM COATED ORAL 2 TIMES DAILY
COMMUNITY
Start: 2024-05-11

## 2024-06-26 RX ORDER — BUTALB/ACETAMINOPHEN/CAFFEINE 50-325-40
2 TABLET ORAL DAILY
COMMUNITY

## 2024-06-26 RX ORDER — METOPROLOL SUCCINATE 50 MG/1
50 TABLET, EXTENDED RELEASE ORAL DAILY
COMMUNITY

## 2024-06-26 RX ORDER — MELOXICAM 7.5 MG/1
7.5 TABLET ORAL DAILY
COMMUNITY

## 2024-06-26 RX ORDER — TORSEMIDE 10 MG/1
10 TABLET ORAL DAILY
COMMUNITY

## 2024-06-26 RX ORDER — MULTIVIT WITH MINERALS/LUTEIN
1000 TABLET ORAL DAILY
COMMUNITY

## 2024-06-26 ASSESSMENT — ENCOUNTER SYMPTOMS
FEVER: 0
CHILLS: 0
VOMITING: 0
NAUSEA: 0

## 2024-06-29 ENCOUNTER — TELEPHONE (OUTPATIENT)
Dept: FAMILY MEDICINE | Age: 89
End: 2024-06-29

## 2025-05-02 ENCOUNTER — HOSPITAL ENCOUNTER (OUTPATIENT)
Dept: GENERAL RADIOLOGY | Facility: HOSPITAL | Age: OVER 89
Discharge: HOME | End: 2025-05-02
Attending: INTERNAL MEDICINE
Payer: MEDICARE

## 2025-05-02 DIAGNOSIS — R06.09 DOE (DYSPNEA ON EXERTION): ICD-10-CM

## 2025-05-02 DIAGNOSIS — R07.9 CHEST PAIN, UNSPECIFIED: ICD-10-CM

## 2025-05-02 PROCEDURE — 71046 X-RAY EXAM CHEST 2 VIEWS: CPT | Performed by: INTERNAL MEDICINE

## (undated) NOTE — LETTER
22    Patient: Phan Ray  : 3/15/1932 Visit date: 2022    Dear  Tomasa Gracia    Thank you for referring Phan Ray to my practice. Please find my assessment and plan below.        History of Present Illness     80year-old f nifedipine  Continue sitz bath after bowel movement for symptomatic relief.  Continue dietary fiber and hydration to maintain soft stools  Follow-up in 2 weeks        Sincerely,       Eric Aldana MD   CC: No Recipients

## (undated) NOTE — ED AVS SNAPSHOT
Harris Abraham   MRN: [id number]    Department:  [facility] Emergency Department   Date of Visit:  7/2/2019           Disclosure     Insurance plans vary and the physician(s) referred by the ER may not be covered by your plan.  Please contact your ins tell this physician (or your personal doctor if your instructions are to return to your personal doctor) about any new or lasting problems. The primary care or specialist physician will see patients referred from the [facility] Emergency Department.  Rob Garcia